# Patient Record
Sex: MALE | Race: WHITE | Employment: FULL TIME | URBAN - METROPOLITAN AREA
[De-identification: names, ages, dates, MRNs, and addresses within clinical notes are randomized per-mention and may not be internally consistent; named-entity substitution may affect disease eponyms.]

---

## 2017-01-30 ENCOUNTER — OFFICE VISIT (OUTPATIENT)
Dept: FAMILY MEDICINE CLINIC | Age: 57
End: 2017-01-30

## 2017-01-30 VITALS
DIASTOLIC BLOOD PRESSURE: 74 MMHG | TEMPERATURE: 97.6 F | BODY MASS INDEX: 37.03 KG/M2 | HEART RATE: 72 BPM | HEIGHT: 73 IN | SYSTOLIC BLOOD PRESSURE: 122 MMHG | WEIGHT: 279.4 LBS | RESPIRATION RATE: 16 BRPM

## 2017-01-30 DIAGNOSIS — M17.0 PRIMARY OSTEOARTHRITIS OF BOTH KNEES: ICD-10-CM

## 2017-01-30 DIAGNOSIS — Z12.5 SCREENING FOR PROSTATE CANCER: ICD-10-CM

## 2017-01-30 DIAGNOSIS — M13.0 POLYARTHRITIS: ICD-10-CM

## 2017-01-30 DIAGNOSIS — E78.5 DYSLIPIDEMIA: ICD-10-CM

## 2017-01-30 DIAGNOSIS — R53.82 CHRONIC FATIGUE: ICD-10-CM

## 2017-01-30 DIAGNOSIS — I10 ESSENTIAL HYPERTENSION: Primary | ICD-10-CM

## 2017-01-30 PROCEDURE — 99214 OFFICE O/P EST MOD 30 MIN: CPT | Performed by: FAMILY MEDICINE

## 2017-01-30 RX ORDER — SILDENAFIL 100 MG/1
100 TABLET, FILM COATED ORAL PRN
Qty: 10 TABLET | Refills: 5 | Status: SHIPPED | OUTPATIENT
Start: 2017-01-30

## 2017-01-30 RX ORDER — ZOLPIDEM TARTRATE 10 MG/1
10 TABLET ORAL NIGHTLY PRN
Qty: 15 TABLET | Refills: 5 | Status: SHIPPED | OUTPATIENT
Start: 2017-01-30 | End: 2017-07-21 | Stop reason: SDUPTHER

## 2017-01-30 RX ORDER — HYDROCODONE BITARTRATE AND ACETAMINOPHEN 5; 325 MG/1; MG/1
1-2 TABLET ORAL EVERY 8 HOURS PRN
Qty: 100 TABLET | Refills: 0 | Status: SHIPPED | OUTPATIENT
Start: 2017-01-30 | End: 2017-07-21 | Stop reason: SDUPTHER

## 2017-06-12 LAB
ALBUMIN SERPL-MCNC: 3.9 G/DL
ALP BLD-CCNC: 44 U/L
ALT SERPL-CCNC: 22 U/L
AST SERPL-CCNC: 19 U/L
BASOPHILS ABSOLUTE: 0.04 /ΜL
BASOPHILS RELATIVE PERCENT: 0.7 %
BILIRUB SERPL-MCNC: 0.7 MG/DL (ref 0.1–1.4)
BUN BLDV-MCNC: NORMAL MG/DL
CALCIUM SERPL-MCNC: 9 MG/DL
CHLORIDE BLD-SCNC: 103 MMOL/L
CHOLESTEROL, TOTAL: 179 MG/DL
CHOLESTEROL/HDL RATIO: 2.3
CO2: NORMAL MMOL/L
CREAT SERPL-MCNC: 0.8 MG/DL
EOSINOPHILS ABSOLUTE: 0.08 /ΜL
EOSINOPHILS RELATIVE PERCENT: 1.5 %
GFR CALCULATED: >60
GLUCOSE BLD-MCNC: 84 MG/DL
HCT VFR BLD CALC: 40.6 % (ref 41–53)
HDLC SERPL-MCNC: 77 MG/DL (ref 35–70)
HEMOGLOBIN: 13 G/DL (ref 13.5–17.5)
LDL CHOLESTEROL CALCULATED: 91 MG/DL (ref 0–160)
LYMPHOCYTES ABSOLUTE: 1.21 /ΜL
LYMPHOCYTES RELATIVE PERCENT: 22.4 %
MCH RBC QN AUTO: 28.7 PG
MCHC RBC AUTO-ENTMCNC: 32 G/DL
MCV RBC AUTO: 89.6 FL
MONOCYTES ABSOLUTE: 0.51 /ΜL
MONOCYTES RELATIVE PERCENT: 9.5 %
NEUTROPHILS ABSOLUTE: 3.55 /ΜL
NEUTROPHILS RELATIVE PERCENT: 65.9 %
PDW BLD-RTO: 14.6 %
PLATELET # BLD: 192 K/ΜL
PMV BLD AUTO: 10.2 FL
POTASSIUM SERPL-SCNC: 4.5 MMOL/L
PROSTATE SPECIFIC ANTIGEN: 1.2 NG/ML
RBC # BLD: 4.53 10^6/ΜL
SODIUM BLD-SCNC: 139 MMOL/L
TOTAL PROTEIN: 7.2
TRIGL SERPL-MCNC: 57 MG/DL
VLDLC SERPL CALC-MCNC: 11 MG/DL
WBC # BLD: 5.4 10^3/ML

## 2017-06-16 DIAGNOSIS — R53.82 CHRONIC FATIGUE: ICD-10-CM

## 2017-06-16 DIAGNOSIS — Z12.5 SCREENING FOR PROSTATE CANCER: ICD-10-CM

## 2017-06-16 DIAGNOSIS — I10 ESSENTIAL HYPERTENSION: ICD-10-CM

## 2017-07-05 RX ORDER — VALSARTAN AND HYDROCHLOROTHIAZIDE 160; 12.5 MG/1; MG/1
1 TABLET, FILM COATED ORAL DAILY
Qty: 90 TABLET | Refills: 1 | Status: SHIPPED | OUTPATIENT
Start: 2017-07-05 | End: 2017-07-07 | Stop reason: SDUPTHER

## 2017-07-07 RX ORDER — VALSARTAN AND HYDROCHLOROTHIAZIDE 160; 12.5 MG/1; MG/1
1 TABLET, FILM COATED ORAL DAILY
Qty: 90 TABLET | Refills: 1 | Status: SHIPPED | OUTPATIENT
Start: 2017-07-07 | End: 2017-12-15 | Stop reason: SDUPTHER

## 2017-07-08 RX ORDER — FAMOTIDINE 20 MG/1
TABLET, FILM COATED ORAL
Qty: 90 TABLET | Refills: 1 | Status: SHIPPED | OUTPATIENT
Start: 2017-07-08 | End: 2017-12-21 | Stop reason: SDUPTHER

## 2017-07-21 ENCOUNTER — OFFICE VISIT (OUTPATIENT)
Dept: FAMILY MEDICINE CLINIC | Age: 57
End: 2017-07-21

## 2017-07-21 VITALS
RESPIRATION RATE: 18 BRPM | HEART RATE: 76 BPM | SYSTOLIC BLOOD PRESSURE: 138 MMHG | TEMPERATURE: 98 F | DIASTOLIC BLOOD PRESSURE: 70 MMHG | WEIGHT: 279.8 LBS | HEIGHT: 74 IN | BODY MASS INDEX: 35.91 KG/M2

## 2017-07-21 DIAGNOSIS — Z00.00 HEALTHCARE MAINTENANCE: Primary | ICD-10-CM

## 2017-07-21 DIAGNOSIS — M17.0 PRIMARY OSTEOARTHRITIS OF BOTH KNEES: ICD-10-CM

## 2017-07-21 DIAGNOSIS — F51.04 CHRONIC INSOMNIA: ICD-10-CM

## 2017-07-21 DIAGNOSIS — M15.9 PRIMARY OSTEOARTHRITIS INVOLVING MULTIPLE JOINTS: ICD-10-CM

## 2017-07-21 DIAGNOSIS — I10 ESSENTIAL HYPERTENSION: ICD-10-CM

## 2017-07-21 PROCEDURE — 99396 PREV VISIT EST AGE 40-64: CPT | Performed by: FAMILY MEDICINE

## 2017-07-21 RX ORDER — HYDROCODONE BITARTRATE AND ACETAMINOPHEN 5; 325 MG/1; MG/1
1-2 TABLET ORAL EVERY 8 HOURS PRN
Qty: 100 TABLET | Refills: 0 | Status: SHIPPED | OUTPATIENT
Start: 2017-07-21 | End: 2018-01-26 | Stop reason: SDUPTHER

## 2017-07-21 RX ORDER — ZOLPIDEM TARTRATE 10 MG/1
10 TABLET ORAL NIGHTLY PRN
Qty: 15 TABLET | Refills: 5 | Status: SHIPPED | OUTPATIENT
Start: 2017-07-21 | End: 2018-01-26 | Stop reason: SDUPTHER

## 2017-07-21 RX ORDER — FINASTERIDE 5 MG/1
5 TABLET, FILM COATED ORAL DAILY
Qty: 30 TABLET | Refills: 5 | Status: SHIPPED | OUTPATIENT
Start: 2017-07-21 | End: 2018-01-26 | Stop reason: SDUPTHER

## 2017-07-21 RX ORDER — TAMSULOSIN HYDROCHLORIDE 0.4 MG/1
CAPSULE ORAL
Qty: 30 CAPSULE | Refills: 5 | Status: SHIPPED | OUTPATIENT
Start: 2017-07-21 | End: 2018-01-26 | Stop reason: SDUPTHER

## 2017-07-21 ASSESSMENT — PATIENT HEALTH QUESTIONNAIRE - PHQ9
SUM OF ALL RESPONSES TO PHQ9 QUESTIONS 1 & 2: 0
2. FEELING DOWN, DEPRESSED OR HOPELESS: 0
SUM OF ALL RESPONSES TO PHQ QUESTIONS 1-9: 0
1. LITTLE INTEREST OR PLEASURE IN DOING THINGS: 0
SUM OF ALL RESPONSES TO PHQ9 QUESTIONS 1 & 2: 0
1. LITTLE INTEREST OR PLEASURE IN DOING THINGS: 0
SUM OF ALL RESPONSES TO PHQ QUESTIONS 1-9: 0
2. FEELING DOWN, DEPRESSED OR HOPELESS: 0

## 2017-12-15 RX ORDER — VALSARTAN AND HYDROCHLOROTHIAZIDE 160; 12.5 MG/1; MG/1
1 TABLET, FILM COATED ORAL DAILY
Qty: 90 TABLET | Refills: 1 | Status: SHIPPED | OUTPATIENT
Start: 2017-12-15 | End: 2018-06-15 | Stop reason: SDUPTHER

## 2017-12-21 RX ORDER — FAMOTIDINE 20 MG/1
TABLET, FILM COATED ORAL
Qty: 90 TABLET | Refills: 1 | Status: SHIPPED | OUTPATIENT
Start: 2017-12-21 | End: 2018-06-27 | Stop reason: SDUPTHER

## 2018-01-26 ENCOUNTER — OFFICE VISIT (OUTPATIENT)
Dept: FAMILY MEDICINE CLINIC | Age: 58
End: 2018-01-26
Payer: COMMERCIAL

## 2018-01-26 VITALS
RESPIRATION RATE: 18 BRPM | BODY MASS INDEX: 37.74 KG/M2 | HEART RATE: 84 BPM | DIASTOLIC BLOOD PRESSURE: 68 MMHG | TEMPERATURE: 97.6 F | WEIGHT: 284.8 LBS | SYSTOLIC BLOOD PRESSURE: 132 MMHG | HEIGHT: 73 IN

## 2018-01-26 DIAGNOSIS — M17.0 PRIMARY OSTEOARTHRITIS OF BOTH KNEES: ICD-10-CM

## 2018-01-26 DIAGNOSIS — B96.89 ACUTE BACTERIAL SINUSITIS: ICD-10-CM

## 2018-01-26 DIAGNOSIS — M15.9 PRIMARY OSTEOARTHRITIS INVOLVING MULTIPLE JOINTS: ICD-10-CM

## 2018-01-26 DIAGNOSIS — J01.90 ACUTE BACTERIAL SINUSITIS: ICD-10-CM

## 2018-01-26 DIAGNOSIS — I10 ESSENTIAL HYPERTENSION: Primary | ICD-10-CM

## 2018-01-26 DIAGNOSIS — R05.9 COUGH: ICD-10-CM

## 2018-01-26 DIAGNOSIS — Z00.00 HEALTHCARE MAINTENANCE: ICD-10-CM

## 2018-01-26 DIAGNOSIS — E78.5 DYSLIPIDEMIA: ICD-10-CM

## 2018-01-26 DIAGNOSIS — F51.04 CHRONIC INSOMNIA: ICD-10-CM

## 2018-01-26 PROCEDURE — 99214 OFFICE O/P EST MOD 30 MIN: CPT | Performed by: FAMILY MEDICINE

## 2018-01-26 RX ORDER — TAMSULOSIN HYDROCHLORIDE 0.4 MG/1
CAPSULE ORAL
Qty: 30 CAPSULE | Refills: 5 | Status: SHIPPED | OUTPATIENT
Start: 2018-01-26 | End: 2018-07-26 | Stop reason: SDUPTHER

## 2018-01-26 RX ORDER — ZOLPIDEM TARTRATE 10 MG/1
10 TABLET ORAL NIGHTLY PRN
Qty: 15 TABLET | Refills: 5 | Status: SHIPPED | OUTPATIENT
Start: 2018-01-26 | End: 2018-07-16 | Stop reason: SDUPTHER

## 2018-01-26 RX ORDER — CEFUROXIME AXETIL 250 MG/1
250 TABLET ORAL 2 TIMES DAILY
Qty: 20 TABLET | Refills: 0 | Status: SHIPPED | OUTPATIENT
Start: 2018-01-26 | End: 2018-02-05

## 2018-01-26 RX ORDER — FINASTERIDE 5 MG/1
5 TABLET, FILM COATED ORAL DAILY
Qty: 30 TABLET | Refills: 5 | Status: SHIPPED | OUTPATIENT
Start: 2018-01-26 | End: 2018-07-26 | Stop reason: SDUPTHER

## 2018-01-26 RX ORDER — HYDROCODONE BITARTRATE AND ACETAMINOPHEN 5; 325 MG/1; MG/1
1 TABLET ORAL EVERY 6 HOURS PRN
Qty: 100 TABLET | Refills: 0 | Status: SHIPPED | OUTPATIENT
Start: 2018-01-26 | End: 2018-07-26 | Stop reason: SDUPTHER

## 2018-01-26 ASSESSMENT — PATIENT HEALTH QUESTIONNAIRE - PHQ9
2. FEELING DOWN, DEPRESSED OR HOPELESS: 0
1. LITTLE INTEREST OR PLEASURE IN DOING THINGS: 0
SUM OF ALL RESPONSES TO PHQ QUESTIONS 1-9: 0
SUM OF ALL RESPONSES TO PHQ9 QUESTIONS 1 & 2: 0

## 2018-07-16 DIAGNOSIS — F51.04 CHRONIC INSOMNIA: ICD-10-CM

## 2018-07-16 RX ORDER — ZOLPIDEM TARTRATE 10 MG/1
10 TABLET ORAL NIGHTLY PRN
Qty: 15 TABLET | Refills: 0 | Status: SHIPPED | OUTPATIENT
Start: 2018-07-16 | End: 2019-01-15 | Stop reason: SDUPTHER

## 2018-07-16 NOTE — TELEPHONE ENCOUNTER
PHARMACY REQUESTING REFILL PATIENT LAST SEEN 1/26/18  PLEASE APPROVE OR DENY.      Future Appointments  Date Time Provider Macrina Turpin   7/26/2018 9:15 AM Esequiel Oakley MD [facility]

## 2018-07-25 LAB
A/G RATIO: 1.6 (ref 0.9–2.4)
ALBUMIN: 4.1 G/DL (ref 3.4–5)
ALP BLD-CCNC: 67 U/L (ref 45–117)
ALT SERPL-CCNC: 81 U/L (ref 10–52)
ANION GAP SERPL CALCULATED.3IONS-SCNC: 9 MMOL/L (ref 10–20)
AST SERPL-CCNC: 49 U/L (ref 13–39)
BASOPHILS # BLD: 0.5 % (ref 0–1.3)
BASOPHILS ABSOLUTE: 0.03 10*3/UL (ref 0.01–0.09)
BICARBONATE: 31 MMOL/L (ref 21–32)
BILIRUB SERPL-MCNC: 0.5 MG/DL (ref 0–1.2)
BUN / CREAT RATIO: 39 (ref 5–25)
CALCIUM SERPL-MCNC: 9.1 MG/DL (ref 8.6–10.3)
CHLORIDE BLD-SCNC: 104 MMOL/L (ref 98–107)
CHOLESTEROL/HDL RATIO: 2.5
CHOLESTEROL: 180 MG/DL
CREAT SERPL-MCNC: 0.7 MG/DL (ref 0.5–1.3)
EOSINOPHIL # BLD: 1.4 % (ref 0–6.7)
EOSINOPHILS ABSOLUTE: 0.08 10*3/UL (ref 0.01–0.46)
ERYTHROCYTE [DISTWIDTH] IN BLOOD BY AUTOMATED COUNT: 13.5 % (ref 12–15.4)
ERYTHROCYTE [DISTWIDTH] IN BLOOD BY AUTOMATED COUNT: 45.9 FL (ref 39.3–48.6)
GFR CALCULATED: >60
GLUCOSE: 91 MG/DL (ref 70–100)
HCT VFR BLD CALC: 41.6 % (ref 38.4–54.9)
HDLC SERPL-MCNC: 73 MG/DL
HEMOGLOBIN: 13.5 G/DL (ref 12.8–17.7)
IMMATURE GRANS (ABS): 0.01 10*3/UL
IMMATURE GRANULOCYTES: 0.2 %
LDL CHOLESTEROL CALCULATED: 96 MG/DL
LYMPHOCYTES # BLD: 21 % (ref 9.4–41.1)
LYMPHOCYTES ABSOLUTE: 1.17 10*3/UL (ref 0.4–2.84)
MCH RBC QN AUTO: 29.7 PG (ref 27.5–32.9)
MCHC RBC AUTO-ENTMCNC: 32.5 G/DL (ref 30.5–35.4)
MCV RBC AUTO: 91.4 FL (ref 83.3–98.2)
MONOCYTES # BLD: 11 % (ref 3–16.2)
MONOCYTES ABSOLUTE: 0.61 10*3/UL (ref 0.25–1.33)
NEUTROPHILS ABSOLUTE: 3.67 10*3/UL (ref 2.22–7.53)
NEUTROPHILS: 65.9 % (ref 46.2–79.1)
PLATELET # BLD: 194 10*3/UL (ref 155–404)
PMV BLD AUTO: 10.8 FL (ref 9.9–12.1)
POTASSIUM SERPL-SCNC: 4.1 MMOL/L (ref 3.5–5.1)
RBC: 4.55 10*6/UL (ref 4.08–6.37)
SODIUM BLD-SCNC: 140 MMOL/L (ref 136–145)
TOTAL PROTEIN: 6.6 G/DL (ref 6.4–8.2)
TRIGL SERPL-MCNC: 57 MG/DL
UREA NITROGEN: 27 MG/DL (ref 6–23)
VLDLC SERPL CALC-MCNC: 11 MG/DL
WBC: 5.6 10*3/UL (ref 4.2–11)

## 2018-07-26 ENCOUNTER — OFFICE VISIT (OUTPATIENT)
Dept: FAMILY MEDICINE CLINIC | Age: 58
End: 2018-07-26
Payer: COMMERCIAL

## 2018-07-26 ENCOUNTER — TELEPHONE (OUTPATIENT)
Dept: FAMILY MEDICINE CLINIC | Age: 58
End: 2018-07-26

## 2018-07-26 VITALS
TEMPERATURE: 97.6 F | SYSTOLIC BLOOD PRESSURE: 120 MMHG | HEIGHT: 73 IN | DIASTOLIC BLOOD PRESSURE: 76 MMHG | HEART RATE: 72 BPM | BODY MASS INDEX: 36.71 KG/M2 | WEIGHT: 277 LBS | RESPIRATION RATE: 14 BRPM

## 2018-07-26 DIAGNOSIS — I10 ESSENTIAL HYPERTENSION: ICD-10-CM

## 2018-07-26 DIAGNOSIS — N39.43 BENIGN PROSTATIC HYPERPLASIA WITH POST-VOID DRIBBLING: ICD-10-CM

## 2018-07-26 DIAGNOSIS — M15.9 PRIMARY OSTEOARTHRITIS INVOLVING MULTIPLE JOINTS: ICD-10-CM

## 2018-07-26 DIAGNOSIS — K76.89 LIVER DYSFUNCTION: ICD-10-CM

## 2018-07-26 DIAGNOSIS — Z00.00 HEALTHCARE MAINTENANCE: Primary | ICD-10-CM

## 2018-07-26 DIAGNOSIS — N40.1 BENIGN PROSTATIC HYPERPLASIA WITH POST-VOID DRIBBLING: ICD-10-CM

## 2018-07-26 DIAGNOSIS — F51.04 CHRONIC INSOMNIA: ICD-10-CM

## 2018-07-26 DIAGNOSIS — M17.0 PRIMARY OSTEOARTHRITIS OF BOTH KNEES: ICD-10-CM

## 2018-07-26 DIAGNOSIS — E78.5 DYSLIPIDEMIA: ICD-10-CM

## 2018-07-26 PROCEDURE — 99396 PREV VISIT EST AGE 40-64: CPT | Performed by: FAMILY MEDICINE

## 2018-07-26 RX ORDER — IRBESARTAN AND HYDROCHLOROTHIAZIDE 150; 12.5 MG/1; MG/1
1 TABLET, FILM COATED ORAL DAILY
Qty: 30 TABLET | Refills: 3 | Status: SHIPPED | OUTPATIENT
Start: 2018-07-26 | End: 2018-12-03 | Stop reason: SDUPTHER

## 2018-07-26 RX ORDER — TAMSULOSIN HYDROCHLORIDE 0.4 MG/1
CAPSULE ORAL
Qty: 30 CAPSULE | Refills: 5 | Status: SHIPPED | OUTPATIENT
Start: 2018-07-26 | End: 2019-01-25 | Stop reason: SDUPTHER

## 2018-07-26 RX ORDER — FINASTERIDE 5 MG/1
5 TABLET, FILM COATED ORAL DAILY
Qty: 30 TABLET | Refills: 5 | Status: SHIPPED | OUTPATIENT
Start: 2018-07-26 | End: 2019-01-25 | Stop reason: SDUPTHER

## 2018-07-26 RX ORDER — ZOLPIDEM TARTRATE 10 MG/1
10 TABLET ORAL NIGHTLY PRN
Qty: 15 TABLET | Refills: 5 | Status: SHIPPED | OUTPATIENT
Start: 2018-07-26 | End: 2019-01-25 | Stop reason: SDUPTHER

## 2018-07-26 RX ORDER — HYDROCODONE BITARTRATE AND ACETAMINOPHEN 5; 325 MG/1; MG/1
1 TABLET ORAL EVERY 6 HOURS PRN
Qty: 100 TABLET | Refills: 0 | Status: SHIPPED | OUTPATIENT
Start: 2018-07-26 | End: 2019-01-25 | Stop reason: SDUPTHER

## 2018-07-26 ASSESSMENT — PATIENT HEALTH QUESTIONNAIRE - PHQ9
2. FEELING DOWN, DEPRESSED OR HOPELESS: 0
1. LITTLE INTEREST OR PLEASURE IN DOING THINGS: 0
SUM OF ALL RESPONSES TO PHQ9 QUESTIONS 1 & 2: 0
SUM OF ALL RESPONSES TO PHQ QUESTIONS 1-9: 0

## 2018-07-26 NOTE — PROGRESS NOTES
Chief Complaint   Patient presents with    6 Month Follow-Up     HTN, dyslipidemia, other chronic medical conditions and labs        Erin Apley is 61 y.o. male who presents for annual physical exam.  Patient was last seen 6 months ago. Hypertension  Patient is here for follow-up of elevated blood pressure. He is exercising and is adherent to a low-salt diet. Blood pressure is well controlled at home. Cardiac symptoms: none. Patient denies chest pain, claudication, dyspnea, lower extremity edema, near-syncope, orthopnea, palpitations and paroxysmal nocturnal dyspnea. Cardiovascular risk factors: advanced age (older than 54 for men, 72 for women), hypertension, male gender and obesity (BMI >= 30 kg/m2). Use of agents associated with hypertension: none. History of target organ damage: none. He is also following up on chronic low back pain and bilateral knee pain. His pain is currently controlled with Norco which he takes as needed. He has severe stomatitis of the knees and degenerative disc disease. He has unable to tolerate NSAIDs. He has had therapy in the past for department. He has no tingling or numbness of his lower extremities. He has been taking the medications as recommended and no evidence of abuse. No red flags. Patient Active Problem List   Diagnosis    Benign prostatic hyperplasia    Hypertension    Dyslipidemia    Insomnia    Obesity    Sleep apnea    Primary osteoarthritis of both knees    Postsurgical nonabsorption       has a current medication list which includes the following prescription(s): hydrocodone-acetaminophen, finasteride, tamsulosin, zolpidem, irbesartan-hydrochlorothiazide, zolpidem, famotidine, sildenafil, multiple vitamin, and vitamin b-12.     Past Medical History:   Diagnosis Date    BPH (benign prostatic hypertrophy)     Dyslipidemia     Hematuria     Hypertension     Insomnia     Obesity     Osteoarthritis     Sleep apnea     Varicose in bowel habits, or black or bloody stools  Genito-Urinary ROS: no dysuria, trouble voiding, or hematuria  Musculoskeletal ROS: positive for - joint pain, joint stiffness and pain in lower back    Neurological ROS: negative for - dizziness, gait disturbance, headaches, impaired coordination/balance, numbness/tingling, tremors or visual changes  Dermatological ROS: negative for - dry skin, lumps, pruritus or rash    Blood pressure 120/76, pulse 72, temperature 97.6 °F (36.4 °C), temperature source Temporal, resp. rate 14, height 6' 1\" (1.854 m), weight 277 lb (125.6 kg). Physical Examination: General appearance - alert, well appearing, and in no distress  Mental status - alert, oriented to person, place, and time  Eyes - pupils equal and reactive, extraocular eye movements intact  Ears - bilateral TM's and external ear canals normal  Mouth - mucous membranes moist, pharynx normal without lesions  Neck - supple, no significant adenopathy  Lymphatics - no palpable lymphadenopathy, no hepatosplenomegaly  Chest - clear to auscultation, no wheezes, rales or rhonchi, symmetric air entry  Heart - normal rate, regular rhythm, normal S1, S2, no murmurs, rubs, clicks or gallops  Abdomen - soft, nontender, nondistended, no masses or organomegaly  Neurological - alert, oriented, normal speech, no focal findings or movement disorder noted  Musculoskeletal - no joint tenderness, deformity or swelling  Extremities: peripheral pulses normal, no pedal edema, no clubbing or cyanosis. Diagnosis Orders   1. Healthcare maintenance     2. Essential hypertension  irbesartan-hydrochlorothiazide (AVALIDE) 150-12.5 MG per tablet   3. Dyslipidemia     4. Primary osteoarthritis involving multiple joints  HYDROcodone-acetaminophen (NORCO) 5-325 MG per tablet   5. Primary osteoarthritis of both knees  HYDROcodone-acetaminophen (NORCO) 5-325 MG per tablet   6. Chronic insomnia  zolpidem (AMBIEN) 10 MG tablet   7.  Liver dysfunction Hepatic Function Panel    Hepatitis C Antibody    Hepatitis B Surface Antigen    Ferritin   8. Benign prostatic hyperplasia with post-void dribbling  finasteride (PROSCAR) 5 MG tablet    tamsulosin (FLOMAX) 0.4 MG capsule         Orders Placed This Encounter   Procedures    Hepatic Function Panel     Standing Status:   Future     Standing Expiration Date:   7/26/2019    Hepatitis C Antibody     Standing Status:   Future     Standing Expiration Date:   7/26/2019    Hepatitis B Surface Antigen     Standing Status:   Future     Standing Expiration Date:   7/26/2019    Ferritin     Standing Status:   Future     Standing Expiration Date:   7/26/2019       Outpatient Encounter Prescriptions as of 7/26/2018   Medication Sig Dispense Refill    HYDROcodone-acetaminophen (NORCO) 5-325 MG per tablet Take 1 tablet by mouth every 6 hours as needed for Pain for up to 90 days. . 100 tablet 0    finasteride (PROSCAR) 5 MG tablet Take 1 tablet by mouth daily 30 tablet 5    tamsulosin (FLOMAX) 0.4 MG capsule TAKE ONE CAPSULE BY MOUTH ONE TIME DAILY 30 capsule 5    zolpidem (AMBIEN) 10 MG tablet Take 1 tablet by mouth nightly as needed for Sleep for up to 30 days. . 15 tablet 5    irbesartan-hydrochlorothiazide (AVALIDE) 150-12.5 MG per tablet Take 1 tablet by mouth daily 30 tablet 3    zolpidem (AMBIEN) 10 MG tablet Take 1 tablet by mouth nightly as needed for Sleep for up to 30 days. . 15 tablet 0    famotidine (PEPCID) 20 MG tablet TAKE ONE TABLET BY MOUTH ONE TIME DAILY 90 tablet 3    sildenafil (VIAGRA) 100 MG tablet Take 1 tablet by mouth as needed for Erectile Dysfunction Max 1 in 72 hours 10 tablet 5    Multiple Vitamin (MULTI VITAMIN MENS PO) Take  by mouth.  vitamin B-12 (CYANOCOBALAMIN) 1000 MCG tablet Take 1,000 mcg by mouth daily.         [DISCONTINUED] valsartan-hydrochlorothiazide (DIOVAN-HCT) 160-12.5 MG per tablet TAKE 1 TABLET BY MOUTH DAILY 90 tablet 1    [DISCONTINUED] HYDROcodone-acetaminophen (NORCO) 5-325 MG per tablet Take 1 tablet by mouth every 6 hours as needed for Pain for up to 90 days. 100 tablet 0    [DISCONTINUED] finasteride (PROSCAR) 5 MG tablet Take 1 tablet by mouth daily 30 tablet 5    [DISCONTINUED] tamsulosin (FLOMAX) 0.4 MG capsule TAKE ONE CAPSULE BY MOUTH ONE TIME DAILY 30 capsule 5     No facility-administered encounter medications on file as of 7/26/2018. The nature of cardiac risk has been fully discussed with this patient. I have made him aware of his LDL target goal given his cardiovascular risk analysis. I have discussed the appropriate diet. The need for lifelong compliance in order to reduce risk is stressed. A regular exercise program is recommended to help achieve and maintain normal body weight, fitness and improve lipid balance. A written copy of a low fat, low cholesterol diet has been given to the patient. Patient education provided. They understand and agree with this course of treatment. They will return with new or worsening symptoms. Patient instructed to remain current with appropriate annual health maintenance. Return in about 6 months (around 1/26/2019). Controlled Substances Monitoring:     RX Monitoring 7/26/2018   Attestation The Prescription Monitoring Report for this patient was reviewed today. Documentation Possible medication side effects, risk of tolerance/dependence & alternative treatments discussed. ;Obtaining appropriate analgesic effect of treatment. ;No signs of potential drug abuse or diversion identified.

## 2018-09-04 RX ORDER — FINASTERIDE 5 MG/1
5 TABLET, FILM COATED ORAL DAILY
Qty: 30 TABLET | Refills: 2 | OUTPATIENT
Start: 2018-09-04

## 2018-09-04 NOTE — TELEPHONE ENCOUNTER
Pharmacy requesting refill. Last OV 7/26/18  rx refused.  rx sent in on 7/26/18 with 5 refills  Future Appointments  Date Time Provider Macrina Turpin   1/25/2019 9:15 AM Sofie Perez MD [address]

## 2018-11-06 LAB
A/G RATIO: 1.5 (ref 0.9–2.4)
ALBUMIN: 4.1 G/DL (ref 3.4–5)
ALP BLD-CCNC: 51 U/L (ref 45–117)
ALT SERPL-CCNC: 17 U/L (ref 10–52)
AST SERPL-CCNC: 16 U/L (ref 13–39)
BILIRUB SERPL-MCNC: 0.4 MG/DL (ref 0–1.2)
BILIRUBIN DIRECT: 0.1 MG/DL (ref 0–0.3)
FERRITIN: 129 NG/ML (ref 20–300)
HEPATITIS B SURFACE ANTIGEN: NONREACTIVE
HEPATITIS C ANTIBODY: NORMAL
TOTAL PROTEIN: 6.9 G/DL (ref 6.4–8.2)

## 2018-12-03 DIAGNOSIS — I10 ESSENTIAL HYPERTENSION: ICD-10-CM

## 2018-12-03 RX ORDER — IRBESARTAN AND HYDROCHLOROTHIAZIDE 150; 12.5 MG/1; MG/1
TABLET, FILM COATED ORAL
Qty: 30 TABLET | Refills: 1 | Status: SHIPPED | OUTPATIENT
Start: 2018-12-03 | End: 2019-01-25 | Stop reason: SDUPTHER

## 2019-01-15 ENCOUNTER — OFFICE VISIT (OUTPATIENT)
Dept: FAMILY MEDICINE CLINIC | Age: 59
End: 2019-01-15
Payer: COMMERCIAL

## 2019-01-15 VITALS
BODY MASS INDEX: 38.04 KG/M2 | RESPIRATION RATE: 14 BRPM | HEIGHT: 73 IN | TEMPERATURE: 97.5 F | SYSTOLIC BLOOD PRESSURE: 136 MMHG | WEIGHT: 287 LBS | DIASTOLIC BLOOD PRESSURE: 74 MMHG | HEART RATE: 72 BPM

## 2019-01-15 DIAGNOSIS — J01.90 ACUTE BACTERIAL SINUSITIS: Primary | ICD-10-CM

## 2019-01-15 DIAGNOSIS — R05.9 COUGH: ICD-10-CM

## 2019-01-15 DIAGNOSIS — B96.89 ACUTE BACTERIAL SINUSITIS: Primary | ICD-10-CM

## 2019-01-15 PROCEDURE — 99213 OFFICE O/P EST LOW 20 MIN: CPT | Performed by: FAMILY MEDICINE

## 2019-01-15 RX ORDER — AMOXICILLIN AND CLAVULANATE POTASSIUM 875; 125 MG/1; MG/1
1 TABLET, FILM COATED ORAL 2 TIMES DAILY
Qty: 20 TABLET | Refills: 0 | Status: SHIPPED | OUTPATIENT
Start: 2019-01-15 | End: 2019-01-25

## 2019-01-15 ASSESSMENT — PATIENT HEALTH QUESTIONNAIRE - PHQ9
SUM OF ALL RESPONSES TO PHQ9 QUESTIONS 1 & 2: 0
SUM OF ALL RESPONSES TO PHQ QUESTIONS 1-9: 0
2. FEELING DOWN, DEPRESSED OR HOPELESS: 0
1. LITTLE INTEREST OR PLEASURE IN DOING THINGS: 0
SUM OF ALL RESPONSES TO PHQ QUESTIONS 1-9: 0

## 2019-01-25 ENCOUNTER — OFFICE VISIT (OUTPATIENT)
Dept: FAMILY MEDICINE CLINIC | Age: 59
End: 2019-01-25
Payer: COMMERCIAL

## 2019-01-25 VITALS
HEART RATE: 60 BPM | RESPIRATION RATE: 14 BRPM | WEIGHT: 292 LBS | SYSTOLIC BLOOD PRESSURE: 122 MMHG | BODY MASS INDEX: 38.7 KG/M2 | TEMPERATURE: 96.8 F | DIASTOLIC BLOOD PRESSURE: 82 MMHG | HEIGHT: 73 IN

## 2019-01-25 DIAGNOSIS — I10 ESSENTIAL HYPERTENSION: Primary | ICD-10-CM

## 2019-01-25 DIAGNOSIS — M15.9 PRIMARY OSTEOARTHRITIS INVOLVING MULTIPLE JOINTS: ICD-10-CM

## 2019-01-25 DIAGNOSIS — N40.1 BENIGN PROSTATIC HYPERPLASIA WITH POST-VOID DRIBBLING: ICD-10-CM

## 2019-01-25 DIAGNOSIS — M17.0 PRIMARY OSTEOARTHRITIS OF BOTH KNEES: ICD-10-CM

## 2019-01-25 DIAGNOSIS — E78.5 DYSLIPIDEMIA: ICD-10-CM

## 2019-01-25 DIAGNOSIS — F51.04 CHRONIC INSOMNIA: ICD-10-CM

## 2019-01-25 DIAGNOSIS — N39.43 BENIGN PROSTATIC HYPERPLASIA WITH POST-VOID DRIBBLING: ICD-10-CM

## 2019-01-25 PROCEDURE — 99214 OFFICE O/P EST MOD 30 MIN: CPT | Performed by: FAMILY MEDICINE

## 2019-01-25 RX ORDER — TAMSULOSIN HYDROCHLORIDE 0.4 MG/1
CAPSULE ORAL
Qty: 30 CAPSULE | Refills: 5 | Status: SHIPPED | OUTPATIENT
Start: 2019-01-25

## 2019-01-25 RX ORDER — FINASTERIDE 5 MG/1
5 TABLET, FILM COATED ORAL DAILY
Qty: 30 TABLET | Refills: 5 | Status: SHIPPED | OUTPATIENT
Start: 2019-01-25

## 2019-01-25 RX ORDER — HYDROCODONE BITARTRATE AND ACETAMINOPHEN 5; 325 MG/1; MG/1
1 TABLET ORAL EVERY 6 HOURS PRN
Qty: 100 TABLET | Refills: 0 | Status: SHIPPED | OUTPATIENT
Start: 2019-01-25 | End: 2019-04-25

## 2019-01-25 RX ORDER — IRBESARTAN AND HYDROCHLOROTHIAZIDE 150; 12.5 MG/1; MG/1
TABLET, FILM COATED ORAL
Qty: 30 TABLET | Refills: 1 | Status: SHIPPED | OUTPATIENT
Start: 2019-01-25 | End: 2019-03-21 | Stop reason: RX

## 2019-01-25 RX ORDER — ZOLPIDEM TARTRATE 10 MG/1
10 TABLET ORAL NIGHTLY PRN
Qty: 15 TABLET | Refills: 5 | Status: SHIPPED | OUTPATIENT
Start: 2019-01-25 | End: 2019-02-24

## 2019-03-12 ENCOUNTER — TELEPHONE (OUTPATIENT)
Dept: FAMILY MEDICINE CLINIC | Age: 59
End: 2019-03-12

## 2019-03-12 DIAGNOSIS — N40.1 BENIGN PROSTATIC HYPERPLASIA WITH POST-VOID DRIBBLING: ICD-10-CM

## 2019-03-12 DIAGNOSIS — N39.43 BENIGN PROSTATIC HYPERPLASIA WITH POST-VOID DRIBBLING: ICD-10-CM

## 2019-03-12 RX ORDER — FINASTERIDE 5 MG/1
TABLET, FILM COATED ORAL
Qty: 30 TABLET | Refills: 5 | OUTPATIENT
Start: 2019-03-12

## 2019-03-21 ENCOUNTER — TELEPHONE (OUTPATIENT)
Dept: FAMILY MEDICINE CLINIC | Age: 59
End: 2019-03-21

## 2019-03-21 RX ORDER — VALSARTAN AND HYDROCHLOROTHIAZIDE 160; 12.5 MG/1; MG/1
1 TABLET, FILM COATED ORAL DAILY
Qty: 90 TABLET | Refills: 1 | Status: SHIPPED | OUTPATIENT
Start: 2019-03-21

## 2019-03-25 ENCOUNTER — TELEPHONE (OUTPATIENT)
Dept: FAMILY MEDICINE CLINIC | Age: 59
End: 2019-03-25

## 2023-02-15 PROBLEM — R19.5 POSITIVE COLORECTAL CANCER SCREENING USING COLOGUARD TEST: Status: ACTIVE | Noted: 2023-02-15

## 2023-02-15 PROBLEM — N20.0 LEFT NEPHROLITHIASIS: Status: ACTIVE | Noted: 2023-02-15

## 2023-02-15 PROBLEM — N28.89 MASS OF KIDNEY: Status: ACTIVE | Noted: 2023-02-15

## 2023-02-15 PROBLEM — J01.00 ACUTE NON-RECURRENT MAXILLARY SINUSITIS: Status: ACTIVE | Noted: 2023-02-15

## 2023-02-15 PROBLEM — R39.11 URINARY HESITANCY: Status: ACTIVE | Noted: 2023-02-15

## 2023-02-15 PROBLEM — F51.04 CHRONIC INSOMNIA: Status: ACTIVE | Noted: 2023-02-15

## 2023-02-15 PROBLEM — M17.9 KNEE OSTEOARTHRITIS: Status: ACTIVE | Noted: 2023-02-15

## 2023-02-15 PROBLEM — E66.812 CLASS 2 OBESITY WITH BODY MASS INDEX (BMI) OF 39.0 TO 39.9 IN ADULT: Status: ACTIVE | Noted: 2023-02-15

## 2023-02-15 PROBLEM — R09.81 SINUS CONGESTION: Status: ACTIVE | Noted: 2023-02-15

## 2023-02-15 PROBLEM — S40.019A SHOULDER CONTUSION: Status: ACTIVE | Noted: 2023-02-15

## 2023-02-15 PROBLEM — K21.9 GERD (GASTROESOPHAGEAL REFLUX DISEASE): Status: ACTIVE | Noted: 2023-02-15

## 2023-02-15 PROBLEM — K65.1 INTRA-ABDOMINAL ABSCESS (MULTI): Status: ACTIVE | Noted: 2023-02-15

## 2023-02-15 PROBLEM — N48.89 PENILE CYST: Status: ACTIVE | Noted: 2023-02-15

## 2023-02-15 PROBLEM — N23 RENAL COLIC ON LEFT SIDE: Status: ACTIVE | Noted: 2023-02-15

## 2023-02-15 PROBLEM — I10 ESSENTIAL HYPERTENSION: Status: ACTIVE | Noted: 2023-02-15

## 2023-02-15 PROBLEM — L72.3 INFECTED SEBACEOUS CYST: Status: ACTIVE | Noted: 2023-02-15

## 2023-02-15 PROBLEM — M19.90 GENERALIZED ARTHRITIS: Status: ACTIVE | Noted: 2023-02-15

## 2023-02-15 PROBLEM — L08.9 INFECTED SEBACEOUS CYST: Status: ACTIVE | Noted: 2023-02-15

## 2023-02-15 PROBLEM — N39.41 BENIGN PROSTATIC HYPERPLASIA (BPH) WITH URINARY URGE INCONTINENCE: Status: ACTIVE | Noted: 2023-02-15

## 2023-02-15 PROBLEM — G89.29 CHRONIC BILATERAL LOW BACK PAIN WITHOUT SCIATICA: Status: ACTIVE | Noted: 2023-02-15

## 2023-02-15 PROBLEM — R10.9 LEFT FLANK PAIN: Status: ACTIVE | Noted: 2023-02-15

## 2023-02-15 PROBLEM — R06.02 SOB (SHORTNESS OF BREATH) ON EXERTION: Status: ACTIVE | Noted: 2023-02-15

## 2023-02-15 PROBLEM — N40.0 PROSTATISM: Status: ACTIVE | Noted: 2023-02-15

## 2023-02-15 PROBLEM — E66.9 CLASS 2 OBESITY WITH BODY MASS INDEX (BMI) OF 39.0 TO 39.9 IN ADULT: Status: ACTIVE | Noted: 2023-02-15

## 2023-02-15 PROBLEM — M25.519 SHOULDER PAIN: Status: ACTIVE | Noted: 2023-02-15

## 2023-02-15 PROBLEM — M19.91 PRIMARY LOCALIZED OSTEOARTHROSIS OF MULTIPLE SITES: Status: ACTIVE | Noted: 2023-02-15

## 2023-02-15 PROBLEM — S91.111A LACERATION OF RIGHT GREAT TOE WITHOUT FOREIGN BODY PRESENT OR DAMAGE TO NAIL: Status: ACTIVE | Noted: 2023-02-15

## 2023-02-15 PROBLEM — R07.89 OTHER CHEST PAIN: Status: ACTIVE | Noted: 2023-02-15

## 2023-02-15 PROBLEM — M54.50 CHRONIC BILATERAL LOW BACK PAIN WITHOUT SCIATICA: Status: ACTIVE | Noted: 2023-02-15

## 2023-02-15 PROBLEM — M25.569 KNEE PAIN: Status: ACTIVE | Noted: 2023-02-15

## 2023-02-15 PROBLEM — N40.1 BENIGN PROSTATIC HYPERPLASIA (BPH) WITH URINARY URGE INCONTINENCE: Status: ACTIVE | Noted: 2023-02-15

## 2023-02-15 PROBLEM — K81.9 CHOLECYSTITIS: Status: ACTIVE | Noted: 2023-02-15

## 2023-02-15 PROBLEM — M77.51 TENDINITIS OF RIGHT FOOT: Status: ACTIVE | Noted: 2023-02-15

## 2023-02-15 PROBLEM — R60.0 LEG EDEMA, LEFT: Status: ACTIVE | Noted: 2023-02-15

## 2023-02-15 PROBLEM — G57.03 PIRIFORMIS SYNDROME OF BOTH SIDES: Status: ACTIVE | Noted: 2023-02-15

## 2023-02-15 PROBLEM — I83.892 VARICOSE VEINS OF LEFT LOWER EXTREMITY WITH EDEMA: Status: ACTIVE | Noted: 2023-02-15

## 2023-02-15 PROBLEM — R05.9 COUGH: Status: ACTIVE | Noted: 2023-02-15

## 2023-02-15 RX ORDER — FINASTERIDE 5 MG/1
1 TABLET, FILM COATED ORAL DAILY
COMMUNITY
Start: 2015-01-21 | End: 2023-12-19 | Stop reason: SDUPTHER

## 2023-02-15 RX ORDER — TADALAFIL 2.5 MG/1
TABLET ORAL
COMMUNITY
End: 2024-02-08 | Stop reason: CLARIF

## 2023-02-15 RX ORDER — MULTIVITAMIN
TABLET ORAL
COMMUNITY
End: 2024-03-07 | Stop reason: ALTCHOICE

## 2023-02-15 RX ORDER — NALOXONE HYDROCHLORIDE 4 MG/.1ML
SPRAY NASAL
COMMUNITY
Start: 2020-09-28

## 2023-02-15 RX ORDER — ZOLPIDEM TARTRATE 10 MG/1
10 TABLET ORAL NIGHTLY PRN
COMMUNITY
Start: 2015-09-09 | End: 2023-03-29 | Stop reason: SDUPTHER

## 2023-02-15 RX ORDER — FAMOTIDINE 20 MG/1
1 TABLET, FILM COATED ORAL DAILY
COMMUNITY
Start: 2015-02-24 | End: 2023-05-08

## 2023-02-15 RX ORDER — VALSARTAN AND HYDROCHLOROTHIAZIDE 160; 12.5 MG/1; MG/1
1 TABLET, FILM COATED ORAL DAILY
COMMUNITY
Start: 2015-01-21 | End: 2023-03-29 | Stop reason: SDUPTHER

## 2023-02-15 RX ORDER — HYDROCODONE BITARTRATE AND ACETAMINOPHEN 5; 325 MG/1; MG/1
1 TABLET ORAL EVERY 6 HOURS PRN
COMMUNITY
Start: 2019-05-08 | End: 2023-03-29 | Stop reason: SDUPTHER

## 2023-02-15 RX ORDER — TAMSULOSIN HYDROCHLORIDE 0.4 MG/1
1 CAPSULE ORAL DAILY
COMMUNITY
Start: 2015-02-24 | End: 2023-06-01

## 2023-03-02 LAB
APPEARANCE, URINE: CLEAR
BACTERIA, URINE: ABNORMAL /HPF
BILIRUBIN, URINE: NEGATIVE
BLOOD, URINE: NEGATIVE
COLOR, URINE: YELLOW
GLUCOSE, URINE: NEGATIVE MG/DL
HYALINE CASTS, URINE: ABNORMAL /LPF
KETONES, URINE: NEGATIVE MG/DL
LEUKOCYTE ESTERASE, URINE: ABNORMAL
MUCUS, URINE: ABNORMAL /LPF
NITRITE, URINE: NEGATIVE
PH, URINE: 5 (ref 5–8)
PROTEIN, URINE: NEGATIVE MG/DL
RBC, URINE: 3 /HPF (ref 0–5)
SPECIFIC GRAVITY, URINE: 1.02 (ref 1–1.03)
SQUAMOUS EPITHELIAL CELLS, URINE: <1 /HPF
UROBILINOGEN, URINE: <2 MG/DL (ref 0–1.9)
WBC, URINE: 4 /HPF (ref 0–5)

## 2023-03-03 LAB — URINE CULTURE: NO GROWTH

## 2023-03-14 ENCOUNTER — TELEMEDICINE (OUTPATIENT)
Dept: PRIMARY CARE | Facility: CLINIC | Age: 63
End: 2023-03-14
Payer: COMMERCIAL

## 2023-03-14 DIAGNOSIS — R05.9 COUGH: Primary | ICD-10-CM

## 2023-03-14 DIAGNOSIS — J40 BRONCHITIS: ICD-10-CM

## 2023-03-14 DIAGNOSIS — R09.81 SINUS CONGESTION: ICD-10-CM

## 2023-03-14 PROCEDURE — 99212 OFFICE O/P EST SF 10 MIN: CPT | Performed by: NURSE PRACTITIONER

## 2023-03-14 RX ORDER — ACETAMINOPHEN 325 MG/1
325 TABLET ORAL EVERY 6 HOURS PRN
COMMUNITY
Start: 2016-06-14

## 2023-03-14 RX ORDER — AMOXICILLIN 875 MG/1
875 TABLET, FILM COATED ORAL 2 TIMES DAILY
Qty: 20 TABLET | Refills: 0 | Status: SHIPPED | OUTPATIENT
Start: 2023-03-14 | End: 2023-03-24

## 2023-03-14 RX ORDER — MAGNESIUM 200 MG
TABLET ORAL
COMMUNITY

## 2023-03-14 ASSESSMENT — ENCOUNTER SYMPTOMS
WHEEZING: 0
HEARTBURN: 0
SINUS PRESSURE: 1
HEMATOLOGIC/LYMPHATIC NEGATIVE: 1
WEIGHT LOSS: 0
HEMOPTYSIS: 0
NEUROLOGICAL NEGATIVE: 1
HEADACHES: 0
CHILLS: 0
GASTROINTESTINAL NEGATIVE: 1
COUGH: 1
MYALGIAS: 0
RHINORRHEA: 1
FEVER: 0
ALLERGIC/IMMUNOLOGIC NEGATIVE: 1
SHORTNESS OF BREATH: 0
MUSCULOSKELETAL NEGATIVE: 1
PSYCHIATRIC NEGATIVE: 1
ENDOCRINE NEGATIVE: 1
EYES NEGATIVE: 1
CARDIOVASCULAR NEGATIVE: 1
SWEATS: 0
SORE THROAT: 1

## 2023-03-14 NOTE — PROGRESS NOTES
Subjective   Patient ID: Terry Lee is a 62 y.o. male who presents for No chief complaint on file..  Patient is virtual visit, telephone only today.  Patient has congestion, cough, and runny nose.   Patient has been taking tylenol and zicam with minimal relief.  Patient reports he gets this frequently and continues to get worse without antibiotics. Patient has increased fluids and honey for cough with minimal relief.      Cough  This is a recurrent problem. The current episode started 1 to 4 weeks ago. The problem has been gradually worsening. The problem occurs constantly. The cough is Productive of purulent sputum. Associated symptoms include nasal congestion, rhinorrhea and a sore throat. Pertinent negatives include no chest pain, chills, ear congestion, ear pain, fever, headaches, heartburn, hemoptysis, myalgias, postnasal drip, rash, shortness of breath, sweats, weight loss or wheezing. Nothing aggravates the symptoms.       Review of Systems   Constitutional:  Negative for chills, fever and weight loss.   HENT:  Positive for rhinorrhea, sinus pressure and sore throat. Negative for ear pain and postnasal drip.    Eyes: Negative.    Respiratory:  Positive for cough. Negative for hemoptysis, shortness of breath and wheezing.    Cardiovascular: Negative.  Negative for chest pain.   Gastrointestinal: Negative.  Negative for heartburn.   Endocrine: Negative.    Genitourinary: Negative.    Musculoskeletal: Negative.  Negative for myalgias.   Skin:  Negative for rash.   Allergic/Immunologic: Negative.    Neurological: Negative.  Negative for headaches.   Hematological: Negative.    Psychiatric/Behavioral: Negative.     All other systems reviewed and are negative.      There were no vitals taken for this visit.    Objective   Physical Exam   Unable to obtain vitals nor physical exam due to virtual visit.     Assessment/Plan   Problem List Items Addressed This Visit          Respiratory     Patient will continue  to increase fluids, take medication as prescribed and monitor for worsening symptoms.          Cough - Primary    Relevant Medications    amoxicillin (Amoxil) 875 mg tablet    Bronchitis    Relevant Medications    amoxicillin (Amoxil) 875 mg tablet       Other    Sinus congestion

## 2023-03-14 NOTE — PATIENT INSTRUCTIONS
Virtual Visit, telephone only today with office.  Diagnosis, treatment, and possible complications of today's illness discussed and explained to patient.  Patient to take medications as discussed and associated with this visit.  Patient may also take OTC analgesic/antipyretic if needed for pain/fever.  Advised to increase oral fluid intake as tolerated if no nausea or vomiting.  Patient Education on when to seek urgent/emergent care.  Patient educated and advised to come back if worsening or persistent symptoms. Patient was given opportunity to ask questions and denied any at this time.  Patient verbalized understanding of plan of care.

## 2023-03-14 NOTE — PROGRESS NOTES
Patient's PCP is Griffin Sharma MD      COLD SYMPTOMS  At home Covid-19 test: NO   Symptoms:  Runny nose, Congestion, Post Nasal Drainage, Productive Cough---Coughing fits, Sore throat, Sneezing, Fatigue,    Length of Symptoms: 2 Weeks   Denies: Fever, Headache, Sinus pressure on forehead and under eyes, BILATERAL LEFT RIGHT Ear ache pressure or popping, SOB, Wheezing, Nausea, Diarrhea, Vomiting, Chills, Body aches,   Factors that effect symptoms: Tylenol, Cough Drops, Zycam     --Related Information--  Weight: 280 Lb   Covid-19 Vaccinated:  Yes       -------------------------------- Answers submitted by the patient for this visit:  Cough Questionnaire (Submitted on 3/14/2023)  Chief Complaint: Cough  Chronicity: recurrent  Onset: 1 to 4 weeks ago  Progression since onset: gradually worsening  Frequency: constantly  Cough characteristics: productive of purulent sputum  chest pain: No  chills: No  ear congestion: No  ear pain: No  fever: No  headaches: No  heartburn: No  hemoptysis: No  myalgias: No  nasal congestion: Yes  postnasal drip: No  rash: No  rhinorrhea: Yes  shortness of breath: No  sore throat: Yes  sweats: No  weight loss: No  wheezing: No  Aggravated by: nothing

## 2023-03-14 NOTE — ASSESSMENT & PLAN NOTE
Patient will continue to increase fluids, take medication as prescribed and monitor for worsening symptoms.

## 2023-03-14 NOTE — ASSESSMENT & PLAN NOTE
Patient to take medications associated with this visit.  Patient may also take OTC analgesic/antipyretic if needed for pain/fever.  Advised to increase oral fluid intake as tolerated.

## 2023-03-29 ENCOUNTER — OFFICE VISIT (OUTPATIENT)
Dept: PRIMARY CARE | Facility: CLINIC | Age: 63
End: 2023-03-29
Payer: COMMERCIAL

## 2023-03-29 VITALS
DIASTOLIC BLOOD PRESSURE: 86 MMHG | WEIGHT: 296 LBS | RESPIRATION RATE: 16 BRPM | OXYGEN SATURATION: 94 % | BODY MASS INDEX: 39.23 KG/M2 | HEART RATE: 60 BPM | TEMPERATURE: 96.2 F | HEIGHT: 73 IN | SYSTOLIC BLOOD PRESSURE: 136 MMHG

## 2023-03-29 DIAGNOSIS — G89.29 CHRONIC BILATERAL LOW BACK PAIN WITHOUT SCIATICA: ICD-10-CM

## 2023-03-29 DIAGNOSIS — E66.01 CLASS 2 SEVERE OBESITY DUE TO EXCESS CALORIES WITH SERIOUS COMORBIDITY AND BODY MASS INDEX (BMI) OF 39.0 TO 39.9 IN ADULT (MULTI): ICD-10-CM

## 2023-03-29 DIAGNOSIS — M54.50 CHRONIC BILATERAL LOW BACK PAIN WITHOUT SCIATICA: ICD-10-CM

## 2023-03-29 DIAGNOSIS — F51.04 CHRONIC INSOMNIA: ICD-10-CM

## 2023-03-29 DIAGNOSIS — J01.00 ACUTE NON-RECURRENT MAXILLARY SINUSITIS: ICD-10-CM

## 2023-03-29 DIAGNOSIS — M19.91 PRIMARY LOCALIZED OSTEOARTHROSIS OF MULTIPLE SITES: ICD-10-CM

## 2023-03-29 DIAGNOSIS — I10 ESSENTIAL HYPERTENSION: Primary | ICD-10-CM

## 2023-03-29 DIAGNOSIS — K21.9 GASTROESOPHAGEAL REFLUX DISEASE WITHOUT ESOPHAGITIS: ICD-10-CM

## 2023-03-29 DIAGNOSIS — J20.9 ACUTE BRONCHITIS, UNSPECIFIED ORGANISM: ICD-10-CM

## 2023-03-29 PROBLEM — E78.5 DYSLIPIDEMIA: Status: ACTIVE | Noted: 2023-03-29

## 2023-03-29 PROCEDURE — 3075F SYST BP GE 130 - 139MM HG: CPT | Performed by: FAMILY MEDICINE

## 2023-03-29 PROCEDURE — 3079F DIAST BP 80-89 MM HG: CPT | Performed by: FAMILY MEDICINE

## 2023-03-29 PROCEDURE — 3008F BODY MASS INDEX DOCD: CPT | Performed by: FAMILY MEDICINE

## 2023-03-29 PROCEDURE — 1036F TOBACCO NON-USER: CPT | Performed by: FAMILY MEDICINE

## 2023-03-29 PROCEDURE — 99214 OFFICE O/P EST MOD 30 MIN: CPT | Performed by: FAMILY MEDICINE

## 2023-03-29 RX ORDER — VALSARTAN AND HYDROCHLOROTHIAZIDE 160; 12.5 MG/1; MG/1
1 TABLET, FILM COATED ORAL DAILY
Qty: 90 TABLET | Refills: 1 | Status: SHIPPED | OUTPATIENT
Start: 2023-03-29 | End: 2023-06-29 | Stop reason: SDUPTHER

## 2023-03-29 RX ORDER — LEVOFLOXACIN 500 MG/1
500 TABLET, FILM COATED ORAL DAILY
Qty: 10 TABLET | Refills: 0 | Status: SHIPPED | OUTPATIENT
Start: 2023-03-29 | End: 2023-04-08

## 2023-03-29 RX ORDER — HYDROCODONE BITARTRATE AND ACETAMINOPHEN 5; 325 MG/1; MG/1
1 TABLET ORAL EVERY 6 HOURS PRN
Qty: 100 TABLET | Refills: 0 | Status: SHIPPED | OUTPATIENT
Start: 2023-03-29 | End: 2023-04-28

## 2023-03-29 RX ORDER — PROMETHAZINE HYDROCHLORIDE AND DEXTROMETHORPHAN HYDROBROMIDE 6.25; 15 MG/5ML; MG/5ML
5 SYRUP ORAL EVERY 4 HOURS PRN
Qty: 200 ML | Refills: 0 | Status: SHIPPED | OUTPATIENT
Start: 2023-03-29 | End: 2023-04-08

## 2023-03-29 RX ORDER — ZOLPIDEM TARTRATE 10 MG/1
10 TABLET ORAL NIGHTLY PRN
Qty: 30 TABLET | Refills: 2 | Status: SHIPPED | OUTPATIENT
Start: 2023-03-29 | End: 2023-06-29 | Stop reason: SDUPTHER

## 2023-03-29 ASSESSMENT — PATIENT HEALTH QUESTIONNAIRE - PHQ9
SUM OF ALL RESPONSES TO PHQ9 QUESTIONS 1 AND 2: 0
2. FEELING DOWN, DEPRESSED OR HOPELESS: NOT AT ALL
1. LITTLE INTEREST OR PLEASURE IN DOING THINGS: NOT AT ALL

## 2023-03-29 ASSESSMENT — LIFESTYLE VARIABLES: TOTAL SCORE: 3

## 2023-03-29 NOTE — PROGRESS NOTES
Chief Complaint   Patient presents with    Follow-up     3 month follow up on hypertension, chronic back pain, insomnia and other chronic medical conditions     Patient is here for follow-up on hypertension and hyperlipidemia. He is exercising and is adherent to a low-salt diet. Patient denies chest pain, dyspnea, exertional chest pressure/discomfort, near-syncope, orthopnea, palpitations, paroxysmal nocturnal dyspnea, and syncope. Taking his medication regularly with no side effects.    He is also here to follow-up on his chronic low back pain.  Has history of lumbar spondylolsis with myelopathy which has been chronic. Has recurrent tingling and numbness of the lower extremities. He has failed conservative treatment in the past including physical therapy, NSAIDS, multiple epidural injection and has been to pain management. He has been taking the medication as prescribed with no evidence of abuse.     Patient also complains of symptoms of a URI. Symptoms include congestion, nasal congestion, post nasal drip, sinus pressure, and cough  Onset of symptoms was a week ago, and has been gradually worsening since that time. Treatment to date: none.     OARRS:  Griffin Sharma MD on 3/29/2023  8:44 AM  I have personally reviewed the OARRS report for Terry Lee. I have considered the risks of abuse, dependence, addiction and diversion    Is the patient prescribed a combination of a benzodiazepine and opioid?  No    Last Urine Drug Screen / ordered today: No  Recent Results (from the past 07080 hour(s))   Zolpidem Confirmation, Urine    Collection Time: 09/27/21 10:27 AM   Result Value Ref Range    Zolpidem 26 (A) Cutoff <25 ng/mL    Zolpidem Metabolite (ZCA) >1000 (A) Cutoff <25 ng/mL     N/A    Controlled Substance Agreement:  Date of the Last Agreement: 3/29/2023  Reviewed Controlled Substance Agreement including but not limited to the benefits, risks, and alternatives to treatment with a Controlled Substance  medication(s).    Opioids:  What is the patient's goal of therapy? Pain control and to be able to walk and perform activities of daily living without pain     Is this being achieved with current treatment? yes    I have calculated the patient's Morphine Dose Equivalent (MED):   I have considered referral to Pain Management and/or a specialist, and do not feel it is necessary at this time.    I feel that it is clinically indicated to continue this current medication regimen after consideration of alternative therapies, and other non-opioid treatment.    Opioid Risk Screening:  Family History of Substance Abuse  Alcohol: 3 (3/29/2023  9:00 AM)  Illegal Drugs: 0 (3/29/2023  9:00 AM)  Prescription Drugs: 0 (3/29/2023  9:00 AM)    Personal History of Substance Abuse  Alcohol: 0 (3/29/2023  9:00 AM)  Illegal Drugs: 0 (3/29/2023  9:00 AM)  Prescription Drugs: 0 (3/29/2023  9:00 AM)    Patient Age (16-45)  Age (16-45): 0 (3/29/2023  9:00 AM)    History of Preadolescent Sexual Abuse  History of Preadolescent Sexual Abuse: .0 (3/29/2023  9:00 AM)    Psychological Disease  Attention Deficit Disorder, Obsessive Compulsive Disorder, Bipolar, Schizophrenia: 0 (3/29/2023  9:00 AM)  Depression: 0 (3/29/2023  9:00 AM)    Total Score  Total Score: 3 (3/29/2023  9:00 AM)    Total Score Risk Category  TOTAL SCORE CATEGORY: Low Risk (0-3) (3/29/2023  9:00 AM)      Pain Assessment:  Analgesia  What was your pain level on average during the past week?: 3  What was your pain level at its worst during the past week?: 8  What percentage of your pain has been relieved during the past week?: 80 %  Is the amount of pain relief you are now obtaining from your current pain relievers enough to make a real difference in your life?: Y  Query to Clinician: Is the patient's pain relief clinically significant?: Yes    Activities of Daily Living  Physical Functioning: Better  Family Relationships: Better  Social Relationships: Better  Mood:  Better  Sleep Patterns: Better  Overall Functioning: Better    Adverse Events  Is patient experiencing any side effects from current pain relievers?: N      Assessment  Is your overall impression that this patient is benefiting from opioid therapy?: Yes  Specific Analgesic Plan: Continue present regimen      and Sleep Aids:   What is the patient's goal of therapy? To be able to get uninterrupted sleep for 6-8 hours during the night without being unduly tired, dizzy or drowsy during the day    Is this being achieved with current treatment? yes    Activities of Daily Living:   Is your overall impression that this patient is benefiting (symptom reduction outweighs side effects) from sleep aid therapy? Yes     1. Physical Functioning: Better  2. Family Relationship: Better  3. Social Relationship: Better  4. Mood: Better  5. Sleep Patterns: Better  6. Overall Function: Better    Patient Active Problem List   Diagnosis    Acute non-recurrent maxillary sinusitis    Benign prostatic hyperplasia (BPH) with urinary urge incontinence    Cholecystitis    Chronic bilateral low back pain without sciatica    Chronic insomnia    Cough    Essential hypertension    Generalized arthritis    GERD (gastroesophageal reflux disease)    Infected sebaceous cyst    Intra-abdominal abscess (CMS/HCC)    Knee pain    Laceration of right great toe without foreign body present or damage to nail    Left flank pain    Left nephrolithiasis    Mass of kidney    Leg edema, left    Other chest pain    Penile cyst    Piriformis syndrome of both sides    Positive colorectal cancer screening using Cologuard test    Primary localized osteoarthrosis of multiple sites    Knee osteoarthritis    Prostatism    Renal colic on left side    Shoulder contusion    Shoulder pain    Sinus congestion    SOB (shortness of breath) on exertion    Tendinitis of right foot    Urinary hesitancy    Varicose veins of left lower extremity with edema    Class 2 obesity with  body mass index (BMI) of 39.0 to 39.9 in adult    Acute bronchitis    Dyslipidemia    Primary osteoarthritis of both knees    Sleep apnea           Past Medical History:   Diagnosis Date    Benign prostatic hyperplasia with lower urinary tract symptoms     Benign localized hyperplasia of prostate with urinary obstruction and lower urinary tract symptoms    Personal history of other diseases of the circulatory system     History of hypertension    Personal history of other endocrine, nutritional and metabolic disease     History of vitamin D deficiency    Personal history of other specified conditions     History of gross hematuria        Current Outpatient Medications   Medication Sig Dispense Refill    acetaminophen (Tylenol) 325 mg tablet Take 1 tablet (325 mg) by mouth every 6 hours if needed.      cyanocobalamin, vitamin B-12, (VITAMIN B-12 ORAL)       cyanocobalamin, vitamin B-12, 1,000 mcg tablet, sublingual Place under the tongue.      famotidine (Pepcid) 20 mg tablet Take 1 tablet (20 mg) by mouth once daily.      finasteride (Proscar) 5 mg tablet Take 1 tablet (5 mg) by mouth once daily.      multivit with minerals/lutein (MULTIVITAMIN 50 PLUS ORAL) Take 1 tablet by mouth once daily.      multivitamin tablet Multi Vitamin Daily TABS   Refills: 0       Active      naloxone (Narcan) 4 mg/0.1 mL nasal spray Use as directed      tadalafil (Cialis) 2.5 mg tablet Take 1 tablet 1 hour prior to activity      tamsulosin (Flomax) 0.4 mg 24 hr capsule Take 1 capsule (0.4 mg) by mouth once daily.      HYDROcodone-acetaminophen (Norco) 5-325 mg tablet Take 1 tablet by mouth every 6 hours if needed for severe pain (7 - 10). 100 tablet 0    levoFLOXacin (Levaquin) 500 mg tablet Take 1 tablet (500 mg) by mouth once daily for 10 days. 10 tablet 0    promethazine-DM (Phenergan-DM) 6.25-15 mg/5 mL syrup Take 5 mL by mouth every 4 hours if needed for cough for up to 10 days. 200 mL 0    valsartan-hydrochlorothiazide  "(Diovan-HCT) 160-12.5 mg tablet Take 1 tablet by mouth once daily. 90 tablet 1    zolpidem (Ambien) 10 mg tablet Take 1 tablet (10 mg) by mouth as needed at bedtime for sleep. 30 tablet 2     No current facility-administered medications for this visit.       No Known Allergies    Social History     Tobacco Use    Smoking status: Never    Smokeless tobacco: Never   Vaping Use    Vaping status: Not on file   Substance Use Topics    Alcohol use: Never        Review of Systems - General ROS: negative for - fatigue, fever, malaise, night sweats, sleep disturbance or weight loss  Psychological ROS: negative for - anxiety, concentration difficulties, depression, memory difficulties or sleep disturbances  Hematological and Lymphatic ROS: negative for - bruising, fatigue, night sweats or pallor  Endocrine ROS: negative for - hot flashes, malaise/lethargy, palpitations, polydipsia/polyuria, skin changes, temperature intolerance or unexpected weight changes  Respiratory ROS: negative for - hemoptysis, pleuritic pain, shortness of breath or wheezing  Cardiovascular ROS: no chest pain or dyspnea on exertion  Gastrointestinal ROS: no abdominal pain, change in bowel habits, or black or bloody stools  Genito-Urinary ROS: no dysuria, trouble voiding, or hematuria  Neurological ROS: negative for - dizziness, gait disturbance, headaches, impaired coordination/balance, numbness/tingling, tremors or visual changes  Dermatological ROS: negative for - dry skin, lumps, pruritus or rash      Objective:  Blood pressure 136/86, pulse 60, temperature 35.7 °C (96.2 °F), resp. rate 16, height 1.854 m (6' 1\"), weight 134 kg (296 lb), SpO2 94 %.    Physical Examination: General appearance - alert, well appearing, and in no distress  Mental status - alert, oriented to person, place, and time  Eyes - pupils equal and reactive, extraocular eye movements intact  Ears - bilateral TM's and external ear canals normal  Mouth - mucous membranes moist, " pharynx normal without lesions  Neck The neck is supple and free of adenopathy or masses, the thyroid is normal without enlargement or nodules.  Lymphatics - no palpable lymphadenopathy, no hepatosplenomegaly  Chest - clear to auscultation, no wheezes, rales or rhonchi, symmetric air entry  Heart - normal rate, regular rhythm, normal S1, S2, no murmurs, rubs, clicks or gallops  Abdomen - soft, nontender, nondistended, no masses or organomegaly  Neurological - alert, oriented, normal speech, no focal findings or movement disorder noted  Extremities: peripheral pulses normal, no pedal edema, no clubbing or cyanosis, intact peripheral pulses.  Neurological exam reveals alert, oriented, normal speech, no focal findings or movement disorder noted, cranial nerves II through XII intact, motor and sensory grossly normal bilaterally, no focal deficit..    Orders Only on 03/02/2023   Component Date Value Ref Range Status    WBC, Urine 03/02/2023 4  0 - 5 /HPF Final    RBC, Urine 03/02/2023 3  0 - 5 /HPF Final    Squamous Epithelial Cells, Urine 03/02/2023 <1  /HPF Final    Bacteria, Urine 03/02/2023 1+ (A)  /HPF Final    Mucus, Urine 03/02/2023 1+  /LPF Final    Hyaline Casts, Urine 03/02/2023 1+ (A)  /LPF Final         Assessment / Plan:  Problem List Items Addressed This Visit       Acute bronchitis    Current Assessment & Plan     Recommend liberal oral fluid intake.  Call if symptoms fail to improve as expected.    Follow-up if persistent or worsening symptoms otherwise when necessary.           Relevant Medications    promethazine-DM (Phenergan-DM) 6.25-15 mg/5 mL syrup    levoFLOXacin (Levaquin) 500 mg tablet    Acute non-recurrent maxillary sinusitis    Current Assessment & Plan     Recommend liberal oral fluid intake.  Call if symptoms fail to improve as expected.    Follow-up if persistent or worsening symptoms otherwise when necessary.           Relevant Medications    promethazine-DM (Phenergan-DM) 6.25-15 mg/5 mL  "syrup    levoFLOXacin (Levaquin) 500 mg tablet    Chronic bilateral low back pain without sciatica    Relevant Medications    HYDROcodone-acetaminophen (Norco) 5-325 mg tablet    Other Relevant Orders    Follow Up In [facility] - PCP    Chronic insomnia    Relevant Medications    zolpidem (Ambien) 10 mg tablet    Other Relevant Orders    Follow Up In [facility] - PCP    Class 2 obesity with body mass index (BMI) of 39.0 to 39.9 in adult    Current Assessment & Plan     Continue decrease calorie diet and not more than 1500 calorie per day diet and low-fat diet.  Continue with regular exercise program.  We advised exercise at least 5 days a week for at least 45 minutes and also a minimum of 10,000 steps a day.  The detrimental effects of obesity on health were discussed.           Essential hypertension - Primary    Current Assessment & Plan     Dietary sodium restriction.  Regular aerobic exercise program is recommended to help achieve and maintain normal body weight, fitness and improve lipid balance. .  Dietary changes: Increase soluble fiber  Plant sterols 2grams per day (e.g. Benecol)  Reduce saturated fat, \"trans\" monounsaturated fatty acids, and cholesterol           Relevant Medications    valsartan-hydrochlorothiazide (Diovan-HCT) 160-12.5 mg tablet    Other Relevant Orders    Follow Up In [facility] - PCP    GERD (gastroesophageal reflux disease)    Primary localized osteoarthrosis of multiple sites    Relevant Orders    Follow Up In [facility] - PCP        Outpatient Encounter Medications as of 3/29/2023   Medication Sig Dispense Refill    acetaminophen (Tylenol) 325 mg tablet Take 1 tablet (325 mg) by mouth every 6 hours if needed.      cyanocobalamin, vitamin B-12, (VITAMIN B-12 ORAL)       cyanocobalamin, vitamin B-12, 1,000 mcg tablet, sublingual Place under the tongue.      famotidine (Pepcid) 20 mg tablet Take 1 tablet (20 mg) by mouth once daily.      " finasteride (Proscar) 5 mg tablet Take 1 tablet (5 mg) by mouth once daily.      multivit with minerals/lutein (MULTIVITAMIN 50 PLUS ORAL) Take 1 tablet by mouth once daily.      multivitamin tablet Multi Vitamin Daily TABS   Refills: 0       Active      naloxone (Narcan) 4 mg/0.1 mL nasal spray Use as directed      tadalafil (Cialis) 2.5 mg tablet Take 1 tablet 1 hour prior to activity      tamsulosin (Flomax) 0.4 mg 24 hr capsule Take 1 capsule (0.4 mg) by mouth once daily.      [DISCONTINUED] HYDROcodone-acetaminophen (Norco) 5-325 mg tablet Take 1 tablet by mouth every 6 hours if needed (Pain).      [DISCONTINUED] valsartan-hydrochlorothiazide (Diovan-HCT) 160-12.5 mg tablet Take 1 tablet by mouth once daily.      [DISCONTINUED] zolpidem (Ambien) 10 mg tablet Take 1 tablet (10 mg) by mouth as needed at bedtime for sleep.      [] amoxicillin (Amoxil) 875 mg tablet Take 1 tablet (875 mg) by mouth in the morning and 1 tablet (875 mg) before bedtime. Do all this for 10 days. 20 tablet 0    HYDROcodone-acetaminophen (Norco) 5-325 mg tablet Take 1 tablet by mouth every 6 hours if needed for severe pain (7 - 10). 100 tablet 0    levoFLOXacin (Levaquin) 500 mg tablet Take 1 tablet (500 mg) by mouth once daily for 10 days. 10 tablet 0    promethazine-DM (Phenergan-DM) 6.25-15 mg/5 mL syrup Take 5 mL by mouth every 4 hours if needed for cough for up to 10 days. 200 mL 0    valsartan-hydrochlorothiazide (Diovan-HCT) 160-12.5 mg tablet Take 1 tablet by mouth once daily. 90 tablet 1    zolpidem (Ambien) 10 mg tablet Take 1 tablet (10 mg) by mouth as needed at bedtime for sleep. 30 tablet 2     No facility-administered encounter medications on file as of 3/29/2023.      Scribe Attestation  By signing my name below, I, Brittney Hsu   attest that this documentation has been prepared under the direction and in the presence of Griffin Sharma MD.

## 2023-03-29 NOTE — PATIENT INSTRUCTIONS
BMI was above normal measurement. Current weight: 134 kg (296 lb)  Weight change since last visit (-) denotes wt loss -5.25 lbs   Weight loss needed to achieve BMI 25: 106.9 Lbs  Weight loss needed to achieve BMI 30: 69.1 Lbs  Advised to Increase physical activity.

## 2023-03-29 NOTE — ASSESSMENT & PLAN NOTE
Recommend liberal oral fluid intake.  Call if symptoms fail to improve as expected.    Follow-up if persistent or worsening symptoms otherwise when necessary.

## 2023-05-07 DIAGNOSIS — K21.9 GASTRO-ESOPHAGEAL REFLUX DISEASE WITHOUT ESOPHAGITIS: ICD-10-CM

## 2023-05-08 RX ORDER — FAMOTIDINE 20 MG/1
TABLET, FILM COATED ORAL
Qty: 90 TABLET | Refills: 2 | Status: SHIPPED | OUTPATIENT
Start: 2023-05-08 | End: 2023-06-29 | Stop reason: SDUPTHER

## 2023-05-08 NOTE — TELEPHONE ENCOUNTER
Rx Refill Request     Name: Terry Lee  :  1960     Specific Pharmacy location:  [facility]   Date of last appointment:  3/29/2023   Date of next appointment:  2023   Best number to reach patient:  [phone]

## 2023-06-01 DIAGNOSIS — N40.1 BENIGN PROSTATIC HYPERPLASIA WITH LOWER URINARY TRACT SYMPTOMS: ICD-10-CM

## 2023-06-01 DIAGNOSIS — N39.41 URGE INCONTINENCE: ICD-10-CM

## 2023-06-01 RX ORDER — TAMSULOSIN HYDROCHLORIDE 0.4 MG/1
CAPSULE ORAL
Qty: 90 CAPSULE | Refills: 1 | Status: SHIPPED | OUTPATIENT
Start: 2023-06-01 | End: 2023-12-29 | Stop reason: SDUPTHER

## 2023-06-29 ENCOUNTER — OFFICE VISIT (OUTPATIENT)
Dept: PRIMARY CARE | Facility: CLINIC | Age: 63
End: 2023-06-29
Payer: COMMERCIAL

## 2023-06-29 VITALS
HEIGHT: 73 IN | BODY MASS INDEX: 40.02 KG/M2 | WEIGHT: 302 LBS | OXYGEN SATURATION: 98 % | DIASTOLIC BLOOD PRESSURE: 78 MMHG | TEMPERATURE: 97.7 F | RESPIRATION RATE: 16 BRPM | SYSTOLIC BLOOD PRESSURE: 128 MMHG | HEART RATE: 53 BPM

## 2023-06-29 DIAGNOSIS — Z12.5 ENCOUNTER FOR SCREENING FOR MALIGNANT NEOPLASM OF PROSTATE: ICD-10-CM

## 2023-06-29 DIAGNOSIS — E66.01 CLASS 2 SEVERE OBESITY DUE TO EXCESS CALORIES WITH SERIOUS COMORBIDITY AND BODY MASS INDEX (BMI) OF 39.0 TO 39.9 IN ADULT (MULTI): ICD-10-CM

## 2023-06-29 DIAGNOSIS — K21.9 GASTRO-ESOPHAGEAL REFLUX DISEASE WITHOUT ESOPHAGITIS: ICD-10-CM

## 2023-06-29 DIAGNOSIS — I10 ESSENTIAL HYPERTENSION: Primary | ICD-10-CM

## 2023-06-29 DIAGNOSIS — F51.04 CHRONIC INSOMNIA: ICD-10-CM

## 2023-06-29 DIAGNOSIS — M19.91 PRIMARY LOCALIZED OSTEOARTHROSIS OF MULTIPLE SITES: ICD-10-CM

## 2023-06-29 DIAGNOSIS — M54.50 CHRONIC BILATERAL LOW BACK PAIN WITHOUT SCIATICA: ICD-10-CM

## 2023-06-29 DIAGNOSIS — G89.29 CHRONIC BILATERAL LOW BACK PAIN WITHOUT SCIATICA: ICD-10-CM

## 2023-06-29 DIAGNOSIS — Z79.899 MEDICATION MANAGEMENT: ICD-10-CM

## 2023-06-29 PROBLEM — K65.1 INTRA-ABDOMINAL ABSCESS (MULTI): Status: RESOLVED | Noted: 2023-02-15 | Resolved: 2023-06-29

## 2023-06-29 LAB
POC AMPHETAMINE: NEGATIVE NG/ML
POC BARBITURATES: NEGATIVE NG/ML
POC BENZODIAZEPINES: NEGATIVE NG/ML
POC BUPRENORPHINE URINE: NEGATIVE NG/ML
POC COCAINE: NEGATIVE NG/ML
POC MDMA (NG/ML) IN URINE: NEGATIVE NG/ML
POC METHADONE MANUALLY ENTERED: NEGATIVE NG/ML
POC METHAMPHETAMINE: NEGATIVE NG/ML
POC MORPHINE URINE: NEGATIVE NG/ML
POC OPIATES: NEGATIVE NG/ML
POC OXYCODONE: NEGATIVE NG/ML
POC PHENCYCLIDINE (PCP): NEGATIVE NG/ML
POC THC: NEGATIVE NG/ML
POC TICYCLIC ANTIDEPRESSANTS (TCA): NEGATIVE NG/ML

## 2023-06-29 PROCEDURE — 3008F BODY MASS INDEX DOCD: CPT | Performed by: FAMILY MEDICINE

## 2023-06-29 PROCEDURE — 99214 OFFICE O/P EST MOD 30 MIN: CPT | Performed by: FAMILY MEDICINE

## 2023-06-29 PROCEDURE — 1036F TOBACCO NON-USER: CPT | Performed by: FAMILY MEDICINE

## 2023-06-29 PROCEDURE — 3074F SYST BP LT 130 MM HG: CPT | Performed by: FAMILY MEDICINE

## 2023-06-29 PROCEDURE — 80305 DRUG TEST PRSMV DIR OPT OBS: CPT | Performed by: FAMILY MEDICINE

## 2023-06-29 PROCEDURE — 80368 SEDATIVE HYPNOTICS: CPT

## 2023-06-29 PROCEDURE — 3078F DIAST BP <80 MM HG: CPT | Performed by: FAMILY MEDICINE

## 2023-06-29 RX ORDER — SEMAGLUTIDE 1.34 MG/ML
INJECTION, SOLUTION SUBCUTANEOUS
Qty: 1 EACH | Refills: 2 | Status: SHIPPED | OUTPATIENT
Start: 2023-06-29 | End: 2023-09-29 | Stop reason: WASHOUT

## 2023-06-29 RX ORDER — HYDROCODONE BITARTRATE AND ACETAMINOPHEN 5; 325 MG/1; MG/1
1 TABLET ORAL EVERY 6 HOURS PRN
COMMUNITY
Start: 2020-09-28 | End: 2023-06-29 | Stop reason: SDUPTHER

## 2023-06-29 RX ORDER — FAMOTIDINE 20 MG/1
20 TABLET, FILM COATED ORAL DAILY
Qty: 90 TABLET | Refills: 2 | Status: SHIPPED | OUTPATIENT
Start: 2023-06-29 | End: 2024-02-25

## 2023-06-29 RX ORDER — ZOLPIDEM TARTRATE 10 MG/1
10 TABLET ORAL NIGHTLY PRN
Qty: 30 TABLET | Refills: 2 | Status: SHIPPED | OUTPATIENT
Start: 2023-06-29 | End: 2023-09-29 | Stop reason: SDUPTHER

## 2023-06-29 RX ORDER — HYDROCODONE BITARTRATE AND ACETAMINOPHEN 5; 325 MG/1; MG/1
1 TABLET ORAL EVERY 6 HOURS PRN
Qty: 100 TABLET | Refills: 0 | Status: SHIPPED | OUTPATIENT
Start: 2023-06-29 | End: 2023-07-06 | Stop reason: SDUPTHER

## 2023-06-29 RX ORDER — VALSARTAN AND HYDROCHLOROTHIAZIDE 160; 12.5 MG/1; MG/1
1 TABLET, FILM COATED ORAL DAILY
Qty: 90 TABLET | Refills: 1 | Status: SHIPPED | OUTPATIENT
Start: 2023-06-29 | End: 2023-12-29 | Stop reason: SDUPTHER

## 2023-06-29 ASSESSMENT — PATIENT HEALTH QUESTIONNAIRE - PHQ9
SUM OF ALL RESPONSES TO PHQ9 QUESTIONS 1 AND 2: 1
1. LITTLE INTEREST OR PLEASURE IN DOING THINGS: NOT AT ALL
2. FEELING DOWN, DEPRESSED OR HOPELESS: SEVERAL DAYS
10. IF YOU CHECKED OFF ANY PROBLEMS, HOW DIFFICULT HAVE THESE PROBLEMS MADE IT FOR YOU TO DO YOUR WORK, TAKE CARE OF THINGS AT HOME, OR GET ALONG WITH OTHER PEOPLE: NOT DIFFICULT AT ALL

## 2023-06-29 ASSESSMENT — LIFESTYLE VARIABLES: TOTAL SCORE: 0

## 2023-06-29 NOTE — PROGRESS NOTES
Chief Complaint   Patient presents with    Follow-up     Hypertension, Chronic back pain, Insomnia, and other chronic medical conditions     Patient is here for follow-up on hypertension and other chronic medical conditions. He is exercising and is adherent to a low-salt diet. Blood pressure is well controlled at home. Cardiac symptoms: none. Patient denies chest pain, dyspnea, exertional chest pressure/discomfort, near-syncope, orthopnea, palpitations, paroxysmal nocturnal dyspnea, and syncope.   Use of agents associated with hypertension: none. History of target organ damage: none.    He presents today to follow up on insomnia. Onset was several  years  ago. Patient describes symptoms as frequent night time awakening and difficulty falling asleep. Associated symptoms include: none. Patient denies anxiety, leg cramps, and restless legs. Symptoms have been well-controlled.    He also presents to follow up on chronic low back pain. Current symptoms include: pain in back  (aching in character; 3/10 in severity). Symptoms have been stable from the previous visit. Exacerbating factors identified by the patient are none.    He is also here for discussion regarding weight loss. He has noted a weight gain of approximately 14 pounds over the last 1 year. History of eating disorders: none. There is a family history positive for obesity in the patient. Previous treatments for obesity include self-directed dieting. Obesity associated medical conditions: hypertension. Obesity associated medications: none. Cardiovascular risk factors besides obesity: advanced age (older than 55 for men, 65 for women), hypertension, male gender, and obesity (BMI >= 30 kg/m2).    Patient Active Problem List   Diagnosis    Acute non-recurrent maxillary sinusitis    Benign prostatic hyperplasia (BPH) with urinary urge incontinence    Cholecystitis    Chronic bilateral low back pain without sciatica    Chronic insomnia    Cough    Essential  hypertension    Generalized arthritis    GERD (gastroesophageal reflux disease)    Infected sebaceous cyst    Knee pain    Laceration of right great toe without foreign body present or damage to nail    Left flank pain    Left nephrolithiasis    Mass of kidney    Leg edema, left    Other chest pain    Penile cyst    Piriformis syndrome of both sides    Positive colorectal cancer screening using Cologuard test    Primary localized osteoarthrosis of multiple sites    Knee osteoarthritis    Prostatism    Renal colic on left side    Shoulder contusion    Shoulder pain    Sinus congestion    SOB (shortness of breath) on exertion    Tendinitis of right foot    Urinary hesitancy    Varicose veins of left lower extremity with edema    Class 2 obesity with body mass index (BMI) of 39.0 to 39.9 in adult    Acute bronchitis    Dyslipidemia    Primary osteoarthritis of both knees    Sleep apnea           Past Medical History:   Diagnosis Date    Benign prostatic hyperplasia with lower urinary tract symptoms     Benign localized hyperplasia of prostate with urinary obstruction and lower urinary tract symptoms    Intra-abdominal abscess (CMS/HCC) 02/15/2023    Personal history of other diseases of the circulatory system     History of hypertension    Personal history of other endocrine, nutritional and metabolic disease     History of vitamin D deficiency    Personal history of other specified conditions     History of gross hematuria        Current Outpatient Medications   Medication Sig Dispense Refill    acetaminophen (Tylenol) 325 mg tablet Take 1 tablet (325 mg) by mouth every 6 hours if needed.      cyanocobalamin, vitamin B-12, (VITAMIN B-12 ORAL)       finasteride (Proscar) 5 mg tablet Take 1 tablet (5 mg) by mouth once daily.      multivit with minerals/lutein (MULTIVITAMIN 50 PLUS ORAL) Take 1 tablet by mouth once daily.      tadalafil (Cialis) 2.5 mg tablet Take 1 tablet 1 hour prior to activity      tamsulosin  (Flomax) 0.4 mg 24 hr capsule TAKE 1 CAPSULE BY MOUTH EVERY DAY 90 capsule 1    cyanocobalamin, vitamin B-12, 1,000 mcg tablet, sublingual Place under the tongue.      famotidine (Pepcid) 20 mg tablet Take 1 tablet (20 mg) by mouth once daily. 90 tablet 2    HYDROcodone-acetaminophen (Norco) 5-325 mg tablet Take 1 tablet by mouth every 6 hours if needed for severe pain (7 - 10) (for 30 days). 100 tablet 0    multivitamin tablet Multi Vitamin Daily TABS   Refills: 0       Active      naloxone (Narcan) 4 mg/0.1 mL nasal spray Use as directed      semaglutide (Ozempic) 0.25 mg or 0.5 mg(2 mg/1.5 mL) pen injector Inject 0.25 mg subcutaneous weekly x 4 week then increase to 0.5 mg weekly 1 each 2    valsartan-hydrochlorothiazide (Diovan-HCT) 160-12.5 mg tablet Take 1 tablet by mouth once daily. 90 tablet 1    zolpidem (Ambien) 10 mg tablet Take 1 tablet (10 mg) by mouth as needed at bedtime for sleep. 30 tablet 2     No current facility-administered medications for this visit.       No Known Allergies    Social History     Tobacco Use    Smoking status: Never    Smokeless tobacco: Never   Substance Use Topics    Alcohol use: Never      Review of Systems - General ROS: negative for - fatigue, fever, malaise, night sweats, or weight loss  ENT ROS: negative for - hearing change, nasal discharge, oral lesions, sinus pain, sore throat, tinnitus or vertigo  Allergy and Immunology ROS: negative for - hives, nasal congestion or seasonal allergies  Hematological and Lymphatic ROS: negative for - bruising, fatigue, night sweats or pallor  Endocrine ROS: negative for - hot flashes, malaise/lethargy, palpitations, polydipsia/polyuria, skin changes, or temperature intolerance  Respiratory ROS: negative for - cough, hemoptysis, pleuritic pain, shortness of breath or wheezing  Cardiovascular ROS: no chest pain or dyspnea on exertion  Gastrointestinal ROS: no abdominal pain, change in bowel habits, or black or bloody  "stools  Genito-Urinary ROS: no dysuria, trouble voiding, or hematuria  Neurological ROS: negative for - dizziness, gait disturbance, headaches, impaired coordination/balance, numbness/tingling, tremors or visual changes  Dermatological ROS: negative for - dry skin, lumps, pruritus or rash    Objective:  Blood pressure 128/78, pulse 53, temperature 36.5 °C (97.7 °F), resp. rate 16, height 1.854 m (6' 1\"), weight 137 kg (302 lb), SpO2 98 %.    Physical Examination: General appearance - alert, well appearing, and in no distress  Mental status - alert, oriented to person, place, and time  Eyes - pupils equal and reactive, extraocular eye movements intact  Ears - bilateral TM's and external ear canals normal  Mouth - mucous membranes moist, pharynx normal without lesions  Neck The neck is supple and free of adenopathy or masses, the thyroid is normal without enlargement or nodules.  Lymphatics - no palpable lymphadenopathy, no hepatosplenomegaly  Chest - clear to auscultation, no wheezes, rales or rhonchi, symmetric air entry  Heart - normal rate, regular rhythm, normal S1, S2, no murmurs, rubs, clicks or gallops  Abdomen - soft, nontender, nondistended, no masses or organomegaly  Neurological - alert, oriented, normal speech, no focal findings or movement disorder noted  Extremities: peripheral pulses normal, no pedal edema, no clubbing or cyanosis, intact peripheral pulses.  Neurological exam reveals alert, oriented, normal speech, no focal findings or movement disorder noted, cranial nerves II through XII intact, motor and sensory grossly normal bilaterally, no focal deficit..    Orders Only on 03/02/2023   Component Date Value Ref Range Status    WBC, Urine 03/02/2023 4  0 - 5 /HPF Final    RBC, Urine 03/02/2023 3  0 - 5 /HPF Final    Squamous Epithelial Cells, Urine 03/02/2023 <1  /HPF Final    Bacteria, Urine 03/02/2023 1+ (A)  /HPF Final    Mucus, Urine 03/02/2023 1+  /LPF Final    Hyaline Casts, Urine 03/02/2023 " 1+ (A)  /LPF Final         Assessment / Plan:  Problem List Items Addressed This Visit       Chronic bilateral low back pain without sciatica    Current Assessment & Plan       Natural history and expected course discussed. Questions answered.  Educational material distributed.  Proper lifting, bending technique discussed.  Stretching exercises discussed.  Regular aerobic and trunk strengthening exercises discussed.  Ice to affected area as needed for local pain relief.  Heat to affected area as needed for local pain relief.           Relevant Medications    HYDROcodone-acetaminophen (Norco) 5-325 mg tablet    Other Relevant Orders    Follow Up In [facility] - PCP - Established    POCT waived urine drug screen manually resulted (Completed)    Zolpidem Confirmation, Urine    Chronic insomnia    Relevant Medications    zolpidem (Ambien) 10 mg tablet    Other Relevant Orders    Follow Up [facility] - PCP - Established    POCT waived urine drug screen manually resulted (Completed)    Zolpidem Confirmation, Urine    Class 2 obesity with body mass index (BMI) of 39.0 to 39.9 in adult    Current Assessment & Plan     We will start him on Ozempic.  General weight loss/lifestyle modification strategies discussed (elicit support from others; identify saboteurs; non-food rewards, etc).  Behavioral treatment  Diet interventions: diet diary indefinitely, low calorie (1500 kCal/d) deficit diet, and qualitative changes (increase low-fat, high-fiber foods).  Regular aerobic exercise program discussed.  Pharmacotherapy as ordered.            Relevant Medications    semaglutide (Ozempic) 0.25 mg or 0.5 mg(2 mg/1.5 mL) pen injector    Essential hypertension - Primary    Current Assessment & Plan     Dietary sodium restriction.  Regular aerobic exercise program is recommended to help achieve and maintain normal body weight, fitness and improve lipid balance. .  Dietary changes: Increase soluble fiber  Plant  "sterols 2grams per day (e.g. Benecol)  Reduce saturated fat, \"trans\" monounsaturated fatty acids, and cholesterol           Relevant Medications    valsartan-hydrochlorothiazide (Diovan-HCT) 160-12.5 mg tablet    Other Relevant Orders    Follow Up In [facility] - PCP - Established    CBC and Auto Differential    Comprehensive Metabolic Panel    Lipid Panel    Primary localized osteoarthrosis of multiple sites    Relevant Orders    Follow Up [facility] - PCP - Established     Other Visit Diagnoses       Gastro-esophageal reflux disease without esophagitis        Relevant Medications    famotidine (Pepcid) 20 mg tablet    Other Relevant Orders    Follow Up [facility] - PCP - Established    Encounter for screening for malignant neoplasm of prostate        Relevant Orders    Prostate Specific Antigen, Screen    Medication management        Relevant Orders    POCT waived urine drug screen manually resulted (Completed)    Zolpidem Confirmation, Urine             Outpatient Encounter Medications as of 6/29/2023   Medication Sig Dispense Refill    acetaminophen (Tylenol) 325 mg tablet Take 1 tablet (325 mg) by mouth every 6 hours if needed.      cyanocobalamin, vitamin B-12, (VITAMIN B-12 ORAL)       finasteride (Proscar) 5 mg tablet Take 1 tablet (5 mg) by mouth once daily.      multivit with minerals/lutein (MULTIVITAMIN 50 PLUS ORAL) Take 1 tablet by mouth once daily.      tadalafil (Cialis) 2.5 mg tablet Take 1 tablet 1 hour prior to activity      tamsulosin (Flomax) 0.4 mg 24 hr capsule TAKE 1 CAPSULE BY MOUTH EVERY DAY 90 capsule 1    [DISCONTINUED] famotidine (Pepcid) 20 mg tablet TAKE 1 TABLET BY MOUTH EVERY DAY 90 tablet 2    [DISCONTINUED] HYDROcodone-acetaminophen (Norco) 5-325 mg tablet Take 1 tablet by mouth every 6 hours if needed for severe pain (7 - 10).      [DISCONTINUED] valsartan-hydrochlorothiazide (Diovan-HCT) 160-12.5 mg tablet Take 1 tablet by mouth once daily. " 90 tablet 1    [DISCONTINUED] zolpidem (Ambien) 10 mg tablet Take 1 tablet (10 mg) by mouth as needed at bedtime for sleep. 30 tablet 2    cyanocobalamin, vitamin B-12, 1,000 mcg tablet, sublingual Place under the tongue.      famotidine (Pepcid) 20 mg tablet Take 1 tablet (20 mg) by mouth once daily. 90 tablet 2    HYDROcodone-acetaminophen (Norco) 5-325 mg tablet Take 1 tablet by mouth every 6 hours if needed for severe pain (7 - 10) (for 30 days). 100 tablet 0    multivitamin tablet Multi Vitamin Daily TABS   Refills: 0       Active      naloxone (Narcan) 4 mg/0.1 mL nasal spray Use as directed      semaglutide (Ozempic) 0.25 mg or 0.5 mg(2 mg/1.5 mL) pen injector Inject 0.25 mg subcutaneous weekly x 4 week then increase to 0.5 mg weekly 1 each 2    valsartan-hydrochlorothiazide (Diovan-HCT) 160-12.5 mg tablet Take 1 tablet by mouth once daily. 90 tablet 1    zolpidem (Ambien) 10 mg tablet Take 1 tablet (10 mg) by mouth as needed at bedtime for sleep. 30 tablet 2     No facility-administered encounter medications on file as of 6/29/2023.      OARRS:  Griffin Sharma MD on 6/29/2023  8:06 AM  I have personally reviewed the OARRS report for Terry Lee. I have considered the risks of abuse, dependence, addiction and diversion    Is the patient prescribed a combination of a benzodiazepine and opioid?  No    Last Urine Drug Screen / ordered today: Yes  Recent Results (from the past [id number] hour(s))   POCT waived urine drug screen manually resulted    Collection Time: 06/29/23  8:35 AM   Result Value Ref Range    POC THC Negative Negative, Not applicable ng/mL    POC Cocaine Negative Negative, Not applicable ng/mL    POC Opiates Negative Negative, Not applicable ng/mL    POC Amphetamine Negative Negative, Not applicable ng/mL    POC Phencyclidine (PCP) Negative Negative, Not applicable ng/mL    POC Barbiturates Negative Negative, Not applicable ng/mL    POC Benzodiazepines Negative Negative, Not applicable ng/mL     POC Methamphetamine Negative Negative, Not applicable ng/mL    POC METHADONE MANUALLY ENTERED Negative Negative, Not applicable ng/mL    POC Ticyclic Antidepressants (TCA) Negative Negative, Not applicable ng/mL    POC Oxycodone Negative Negative, Not applicable ng/mL    POC MDMA URINE Negative Negative, Not applicable ng/mL    POC Morphine Urine Negative Negative, Not applicable ng/mL    POC Burprenorphine Urine Negative Negative, Not applicable ng/mL   Zolpidem Confirmation, Urine    Collection Time: 09/27/21 10:27 AM   Result Value Ref Range    Zolpidem 26 (A) Cutoff <25 ng/mL    Zolpidem Metabolite (ZCA) >1000 (A) Cutoff <25 ng/mL     Results are as expected.     Controlled Substance Agreement:  Date of the Last Agreement: 3/29/2023  Reviewed Controlled Substance Agreement including but not limited to the benefits, risks, and alternatives to treatment with a Controlled Substance medication(s).    Opioids:  What is the patient's goal of therapy? Pain control and to be able to walk and perform activities of daily living without pain     Is this being achieved with current treatment? yes    I have calculated the patient's Morphine Dose Equivalent (MED):   I have considered referral to Pain Management and/or a specialist, and do not feel it is necessary at this time.    I feel that it is clinically indicated to continue this current medication regimen after consideration of alternative therapies, and other non-opioid treatment.    Opioid Risk Screening:  Family History of Substance Abuse  Alcohol: 0 (6/29/2023  7:00 AM)  Illegal Drugs: 0 (6/29/2023  7:00 AM)  Prescription Drugs: 0 (6/29/2023  7:00 AM)    Personal History of Substance Abuse  Alcohol: 0 (6/29/2023  7:00 AM)  Illegal Drugs: 0 (6/29/2023  7:00 AM)  Prescription Drugs: 0 (6/29/2023  7:00 AM)    Patient Age (16-45)  Age (16-45): 0 (6/29/2023  7:00 AM)    History of Preadolescent Sexual Abuse  History of Preadolescent Sexual Abuse: .0 (6/29/2023  7:00  AM)    Psychological Disease  Attention Deficit Disorder, Obsessive Compulsive Disorder, Bipolar, Schizophrenia: 0 (6/29/2023  7:00 AM)  Depression: 0 (6/29/2023  7:00 AM)    Total Score  Total Score: 0 (6/29/2023  7:00 AM)    Total Score Risk Category  TOTAL SCORE CATEGORY: Low Risk (0-3) (6/29/2023  7:00 AM)    Pain Assessment:  Analgesia  What was your pain level on average during the past week?: 3  What was your pain level at its worst during the past week?: 5  What percentage of your pain has been relieved during the past week?: 50 %  Is the amount of pain relief you are now obtaining from your current pain relievers enough to make a real difference in your life?: Y  Query to Clinician: Is the patient's pain relief clinically significant?: Yes    Activities of Daily Living  Physical Functioning: Same  Family Relationships: Same  Social Relationships: Same  Mood: Same  Sleep Patterns: Same  Overall Functioning: Same    Adverse Events  Is patient experiencing any side effects from current pain relievers?: N      Assessment  Is your overall impression that this patient is benefiting from opioid therapy?: Yes  Specific Analgesic Plan: Continue present regimen     and Sleep Aids:   What is the patient's goal of therapy? To be able to get uninterrupted sleep for 6-8 hours during the night without being unduly tired, dizzy or drowsy during the day    Is this being achieved with current treatment? yes    Activities of Daily Living:   Is your overall impression that this patient is benefiting (symptom reduction outweighs side effects) from sleep aid therapy? Yes     1. Physical Functioning: Better  2. Family Relationship: Better  3. Social Relationship: Better  4. Mood: Better  5. Sleep Patterns: Better  6. Overall Function: Better    Scribe Attestation  By signing my name below, Jairon VERNON Scribe   attest that this documentation has been prepared under the direction and in the presence of Griffin Sharma  MD.

## 2023-06-29 NOTE — PATIENT INSTRUCTIONS
BMI was above normal measurement. Current weight: 137 kg (302 lb)  Weight change since last visit (-) denotes wt loss 6 lbs   Weight loss needed to achieve BMI 25: 112.9 Lbs  Weight loss needed to achieve BMI 30: 75.1 Lbs  Advised to Increase physical activity.

## 2023-06-29 NOTE — ASSESSMENT & PLAN NOTE
We will start him on Ozempic.  General weight loss/lifestyle modification strategies discussed (elicit support from others; identify saboteurs; non-food rewards, etc).  Behavioral treatment  Diet interventions: diet diary indefinitely, low calorie (1500 kCal/d) deficit diet, and qualitative changes (increase low-fat, high-fiber foods).  Regular aerobic exercise program discussed.  Pharmacotherapy as ordered.

## 2023-07-06 ENCOUNTER — TELEPHONE (OUTPATIENT)
Dept: PRIMARY CARE | Facility: CLINIC | Age: 63
End: 2023-07-06
Payer: COMMERCIAL

## 2023-07-06 DIAGNOSIS — G89.29 CHRONIC BILATERAL LOW BACK PAIN WITHOUT SCIATICA: ICD-10-CM

## 2023-07-06 DIAGNOSIS — M54.50 CHRONIC BILATERAL LOW BACK PAIN WITHOUT SCIATICA: ICD-10-CM

## 2023-07-06 LAB
ZOLPIDEM METABOLITE (ZCA): >1000 NG/ML
ZOLPIDEM: 46 NG/ML

## 2023-07-06 RX ORDER — HYDROCODONE BITARTRATE AND ACETAMINOPHEN 5; 325 MG/1; MG/1
1 TABLET ORAL EVERY 6 HOURS PRN
Qty: 100 TABLET | Refills: 0 | Status: SHIPPED | OUTPATIENT
Start: 2023-07-06 | End: 2023-09-29 | Stop reason: SDUPTHER

## 2023-07-06 NOTE — TELEPHONE ENCOUNTER
Patient's wife, Aspen, called in stating [facility] in [place] does not have they hydrocodone 5-325 in stock and doesn't know when they will be getting it back. Aspen called [facility] pharmacy and was informed they do have it in stock but can't take a transferred prescription. Aspen is requesting a new script to be sent to [facility]. Please advise.

## 2023-09-08 ENCOUNTER — LAB (OUTPATIENT)
Dept: LAB | Facility: LAB | Age: 63
End: 2023-09-08
Payer: COMMERCIAL

## 2023-09-08 DIAGNOSIS — I10 ESSENTIAL HYPERTENSION: ICD-10-CM

## 2023-09-08 LAB
ALANINE AMINOTRANSFERASE (SGPT) (U/L) IN SER/PLAS: 22 U/L (ref 10–52)
ALBUMIN (G/DL) IN SER/PLAS: 4 G/DL (ref 3.4–5)
ALKALINE PHOSPHATASE (U/L) IN SER/PLAS: 49 U/L (ref 33–136)
ANION GAP IN SER/PLAS: 12 MMOL/L (ref 10–20)
APPEARANCE, URINE: CLEAR
ASPARTATE AMINOTRANSFERASE (SGOT) (U/L) IN SER/PLAS: 19 U/L (ref 9–39)
BACTERIA, URINE: ABNORMAL /HPF
BASOPHILS (10*3/UL) IN BLOOD BY AUTOMATED COUNT: 0.04 X10E9/L (ref 0–0.1)
BASOPHILS/100 LEUKOCYTES IN BLOOD BY AUTOMATED COUNT: 0.7 % (ref 0–2)
BILIRUBIN TOTAL (MG/DL) IN SER/PLAS: 0.6 MG/DL (ref 0–1.2)
BILIRUBIN, URINE: NEGATIVE
BLOOD, URINE: ABNORMAL
CALCIUM (MG/DL) IN SER/PLAS: 9 MG/DL (ref 8.6–10.3)
CARBON DIOXIDE, TOTAL (MMOL/L) IN SER/PLAS: 29 MMOL/L (ref 21–32)
CHLORIDE (MMOL/L) IN SER/PLAS: 102 MMOL/L (ref 98–107)
CHOLESTEROL (MG/DL) IN SER/PLAS: 181 MG/DL (ref 0–199)
CHOLESTEROL IN HDL (MG/DL) IN SER/PLAS: 75.9 MG/DL
CHOLESTEROL/HDL RATIO: 2.4
COLOR, URINE: YELLOW
CREATININE (MG/DL) IN SER/PLAS: 0.75 MG/DL (ref 0.5–1.3)
EOSINOPHILS (10*3/UL) IN BLOOD BY AUTOMATED COUNT: 0.08 X10E9/L (ref 0–0.7)
EOSINOPHILS/100 LEUKOCYTES IN BLOOD BY AUTOMATED COUNT: 1.4 % (ref 0–6)
ERYTHROCYTE DISTRIBUTION WIDTH (RATIO) BY AUTOMATED COUNT: 14.7 % (ref 11.5–14.5)
ERYTHROCYTE MEAN CORPUSCULAR HEMOGLOBIN CONCENTRATION (G/DL) BY AUTOMATED: 32.6 G/DL (ref 32–36)
ERYTHROCYTE MEAN CORPUSCULAR VOLUME (FL) BY AUTOMATED COUNT: 89 FL (ref 80–100)
ERYTHROCYTES (10*6/UL) IN BLOOD BY AUTOMATED COUNT: 4.56 X10E12/L (ref 4.5–5.9)
GFR MALE: >90 ML/MIN/1.73M2
GLUCOSE (MG/DL) IN SER/PLAS: 89 MG/DL (ref 74–99)
GLUCOSE, URINE: NEGATIVE MG/DL
HEMATOCRIT (%) IN BLOOD BY AUTOMATED COUNT: 40.5 % (ref 41–52)
HEMOGLOBIN (G/DL) IN BLOOD: 13.2 G/DL (ref 13.5–17.5)
IMMATURE GRANULOCYTES/100 LEUKOCYTES IN BLOOD BY AUTOMATED COUNT: 0.3 % (ref 0–0.9)
KETONES, URINE: NEGATIVE MG/DL
LDL: 95 MG/DL (ref 0–99)
LEUKOCYTE ESTERASE, URINE: ABNORMAL
LEUKOCYTES (10*3/UL) IN BLOOD BY AUTOMATED COUNT: 5.9 X10E9/L (ref 4.4–11.3)
LYMPHOCYTES (10*3/UL) IN BLOOD BY AUTOMATED COUNT: 1.11 X10E9/L (ref 1.2–4.8)
LYMPHOCYTES/100 LEUKOCYTES IN BLOOD BY AUTOMATED COUNT: 18.9 % (ref 13–44)
MONOCYTES (10*3/UL) IN BLOOD BY AUTOMATED COUNT: 0.46 X10E9/L (ref 0.1–1)
MONOCYTES/100 LEUKOCYTES IN BLOOD BY AUTOMATED COUNT: 7.8 % (ref 2–10)
MUCUS, URINE: ABNORMAL /LPF
NEUTROPHILS (10*3/UL) IN BLOOD BY AUTOMATED COUNT: 4.16 X10E9/L (ref 1.2–7.7)
NEUTROPHILS/100 LEUKOCYTES IN BLOOD BY AUTOMATED COUNT: 70.9 % (ref 40–80)
NITRITE, URINE: NEGATIVE
PH, URINE: 5 (ref 5–8)
PLATELETS (10*3/UL) IN BLOOD AUTOMATED COUNT: 164 X10E9/L (ref 150–450)
POTASSIUM (MMOL/L) IN SER/PLAS: 3.9 MMOL/L (ref 3.5–5.3)
PROSTATE SPECIFIC AG (NG/ML) IN SER/PLAS: 0.96 NG/ML (ref 0–4)
PROTEIN TOTAL: 6.9 G/DL (ref 6.4–8.2)
PROTEIN, URINE: NEGATIVE MG/DL
RBC, URINE: 2 /HPF (ref 0–5)
SODIUM (MMOL/L) IN SER/PLAS: 139 MMOL/L (ref 136–145)
SPECIFIC GRAVITY, URINE: 1.02 (ref 1–1.03)
SQUAMOUS EPITHELIAL CELLS, URINE: <1 /HPF
TRIGLYCERIDE (MG/DL) IN SER/PLAS: 53 MG/DL (ref 0–149)
UREA NITROGEN (MG/DL) IN SER/PLAS: 21 MG/DL (ref 6–23)
UROBILINOGEN, URINE: <2 MG/DL (ref 0–1.9)
VLDL: 11 MG/DL (ref 0–40)
WBC, URINE: 2 /HPF (ref 0–5)

## 2023-09-08 PROCEDURE — 80053 COMPREHEN METABOLIC PANEL: CPT

## 2023-09-08 PROCEDURE — 80061 LIPID PANEL: CPT

## 2023-09-08 PROCEDURE — 36415 COLL VENOUS BLD VENIPUNCTURE: CPT

## 2023-09-08 PROCEDURE — 85025 COMPLETE CBC W/AUTO DIFF WBC: CPT

## 2023-09-09 LAB — URINE CULTURE: NORMAL

## 2023-09-29 ENCOUNTER — OFFICE VISIT (OUTPATIENT)
Dept: PRIMARY CARE | Facility: CLINIC | Age: 63
End: 2023-09-29
Payer: COMMERCIAL

## 2023-09-29 VITALS
TEMPERATURE: 97 F | HEIGHT: 73 IN | RESPIRATION RATE: 25 BRPM | WEIGHT: 293.2 LBS | DIASTOLIC BLOOD PRESSURE: 84 MMHG | BODY MASS INDEX: 38.86 KG/M2 | HEART RATE: 61 BPM | OXYGEN SATURATION: 96 % | SYSTOLIC BLOOD PRESSURE: 136 MMHG

## 2023-09-29 DIAGNOSIS — K21.9 GASTROESOPHAGEAL REFLUX DISEASE WITHOUT ESOPHAGITIS: ICD-10-CM

## 2023-09-29 DIAGNOSIS — M65.4 DE QUERVAIN'S TENOSYNOVITIS, LEFT: ICD-10-CM

## 2023-09-29 DIAGNOSIS — E66.01 CLASS 2 SEVERE OBESITY DUE TO EXCESS CALORIES WITH SERIOUS COMORBIDITY AND BODY MASS INDEX (BMI) OF 38.0 TO 38.9 IN ADULT (MULTI): ICD-10-CM

## 2023-09-29 DIAGNOSIS — I10 ESSENTIAL HYPERTENSION: ICD-10-CM

## 2023-09-29 DIAGNOSIS — B35.1 ONYCHOMYCOSIS OF TOENAIL: ICD-10-CM

## 2023-09-29 DIAGNOSIS — M19.91 PRIMARY LOCALIZED OSTEOARTHROSIS OF MULTIPLE SITES: ICD-10-CM

## 2023-09-29 DIAGNOSIS — Z00.00 HEALTHCARE MAINTENANCE: Primary | ICD-10-CM

## 2023-09-29 DIAGNOSIS — F51.04 CHRONIC INSOMNIA: ICD-10-CM

## 2023-09-29 DIAGNOSIS — G89.29 CHRONIC BILATERAL LOW BACK PAIN WITHOUT SCIATICA: ICD-10-CM

## 2023-09-29 DIAGNOSIS — M54.50 CHRONIC BILATERAL LOW BACK PAIN WITHOUT SCIATICA: ICD-10-CM

## 2023-09-29 PROCEDURE — 1036F TOBACCO NON-USER: CPT | Performed by: FAMILY MEDICINE

## 2023-09-29 PROCEDURE — 3075F SYST BP GE 130 - 139MM HG: CPT | Performed by: FAMILY MEDICINE

## 2023-09-29 PROCEDURE — 3008F BODY MASS INDEX DOCD: CPT | Performed by: FAMILY MEDICINE

## 2023-09-29 PROCEDURE — 99396 PREV VISIT EST AGE 40-64: CPT | Performed by: FAMILY MEDICINE

## 2023-09-29 PROCEDURE — 3079F DIAST BP 80-89 MM HG: CPT | Performed by: FAMILY MEDICINE

## 2023-09-29 RX ORDER — CICLOPIROX 80 MG/ML
SOLUTION TOPICAL NIGHTLY
Qty: 6.6 ML | Refills: 11 | Status: SHIPPED | OUTPATIENT
Start: 2023-09-29 | End: 2024-03-07 | Stop reason: ALTCHOICE

## 2023-09-29 RX ORDER — ZOLPIDEM TARTRATE 10 MG/1
10 TABLET ORAL NIGHTLY PRN
Qty: 30 TABLET | Refills: 2 | Status: SHIPPED | OUTPATIENT
Start: 2023-09-29 | End: 2023-12-29 | Stop reason: SDUPTHER

## 2023-09-29 RX ORDER — HYDROCODONE BITARTRATE AND ACETAMINOPHEN 5; 325 MG/1; MG/1
1 TABLET ORAL EVERY 6 HOURS PRN
Qty: 100 TABLET | Refills: 0 | Status: SHIPPED | OUTPATIENT
Start: 2023-09-29 | End: 2023-10-02 | Stop reason: RX

## 2023-09-29 ASSESSMENT — LIFESTYLE VARIABLES: TOTAL SCORE: 0

## 2023-09-29 NOTE — PATIENT INSTRUCTIONS
BMI was above normal measurement. Current weight: 133 kg (293 lb 3.2 oz)  Weight change since last visit (-) denotes wt loss -8.8 lbs   Weight loss needed to achieve BMI 25: 104.1 Lbs  Weight loss needed to achieve BMI 30: 66.3 Lbs  Advised to Increase physical activity.

## 2023-09-29 NOTE — ASSESSMENT & PLAN NOTE
Natural history and expected course discussed. Questions answered.  Rest, ice, compression, and elevation (RICE) therapy.  Reduction in offending activity discussed.  We will also him him start wearing a thumb spica.

## 2023-09-29 NOTE — ASSESSMENT & PLAN NOTE
Stable, continue current medications and management.  Natural history and expected course discussed. Questions answered.  Educational material distributed.  Proper lifting, bending technique discussed.  Stretching exercises discussed.  Regular aerobic and trunk strengthening exercises discussed.  Ice to affected area as needed for local pain relief.  Heat to affected area as needed for local pain relief.

## 2023-09-29 NOTE — PROGRESS NOTES
Chief Complaint   Patient presents with    Annual Exam    Follow-up     Hypertension, Chronic Back Pain, Insomnia and labs    Wrist Pain     Terry Lee is a 63 y.o. male who presents today for annual physical exam and follow up on hypertension and other chronic medical conditions. He is exercising and is adherent to a low-salt diet. Blood pressure is well controlled at home. Cardiac symptoms: none. Patient denies chest pain, dyspnea, exertional chest pressure/discomfort, near-syncope, orthopnea, palpitations, paroxysmal nocturnal dyspnea, and syncope.   Use of agents associated with hypertension: none. History of target organ damage: none.    He is also here to follow-up on her chronic low back pain. Symptoms have been unchanged. He has failed conservative treatment in the past including physical therapy, and NSAIDS. He has been taking the medication as prescribed with no evidence of abuse.    He is following up complains of insomnia. Onset was several years ago. Patient describes symptoms as frequent night time awakening and difficulty falling asleep. Patient has found minimal relief with prescription sleep aid, Ambien. Associated symptoms include: fatigue. Patient denies daytime somnolence, leg cramps, and stress. Symptoms have been well-controlled.    Patient complains of left wrist pain.  The pain is moderate, worsens with movement, and some relief by rest.  There is not associated numbness, tingling in the left hand. There is not a history of injury.    He also complains of discoloration of his toenails.  He has thickening and discoloration and discomfort of the toenails.  He has been using over-the-counter medication with no improvement.    Patient Active Problem List   Diagnosis    Acute non-recurrent maxillary sinusitis    Benign prostatic hyperplasia (BPH) with urinary urge incontinence    Cholecystitis    Chronic bilateral low back pain without sciatica    Chronic insomnia    Cough    Essential  hypertension    Generalized arthritis    GERD (gastroesophageal reflux disease)    Infected sebaceous cyst    Knee pain    Laceration of right great toe without foreign body present or damage to nail    Left flank pain    Left nephrolithiasis    Mass of kidney    Leg edema, left    Other chest pain    Penile cyst    Piriformis syndrome of both sides    Positive colorectal cancer screening using Cologuard test    Primary localized osteoarthrosis of multiple sites    Knee osteoarthritis    Prostatism    Renal colic on left side    Shoulder contusion    Shoulder pain    Sinus congestion    SOB (shortness of breath) on exertion    Tendinitis of right foot    Urinary hesitancy    Varicose veins of left lower extremity with edema    Class 2 severe obesity due to excess calories with serious comorbidity and body mass index (BMI) of 38.0 to 38.9 in adult (CMS/[facility])    Acute bronchitis    Dyslipidemia    Primary osteoarthritis of both knees    Sleep apnea    Healthcare maintenance    De Quervain's tenosynovitis, left    Onychomycosis of toenail           Past Medical History:   Diagnosis Date    Benign prostatic hyperplasia with lower urinary tract symptoms     Benign localized hyperplasia of prostate with urinary obstruction and lower urinary tract symptoms    Intra-abdominal abscess (CMS/[facility]) 02/15/2023    Personal history of other diseases of the circulatory system     History of hypertension    Personal history of other endocrine, nutritional and metabolic disease     History of vitamin D deficiency    Personal history of other specified conditions     History of gross hematuria        Current Outpatient Medications   Medication Sig Dispense Refill    acetaminophen (Tylenol) 325 mg tablet Take 1 tablet (325 mg) by mouth every 6 hours if needed.      cyanocobalamin, vitamin B-12, (VITAMIN B-12 ORAL)       cyanocobalamin, vitamin B-12, 1,000 mcg tablet, sublingual Place under the tongue.      famotidine (Pepcid) 20 mg  tablet Take 1 tablet (20 mg) by mouth once daily. 90 tablet 2    finasteride (Proscar) 5 mg tablet Take 1 tablet (5 mg) by mouth once daily.      multivit with minerals/lutein (MULTIVITAMIN 50 PLUS ORAL) Take 1 tablet by mouth once daily.      multivitamin tablet Multi Vitamin Daily TABS   Refills: 0       Active      naloxone (Narcan) 4 mg/0.1 mL nasal spray Use as directed      tadalafil (Cialis) 2.5 mg tablet Take 1 tablet 1 hour prior to activity      tamsulosin (Flomax) 0.4 mg 24 hr capsule TAKE 1 CAPSULE BY MOUTH EVERY DAY 90 capsule 1    valsartan-hydrochlorothiazide (Diovan-HCT) 160-12.5 mg tablet Take 1 tablet by mouth once daily. 90 tablet 1    ciclopirox (Penlac) 8 % solution Apply topically once daily at bedtime. 6.6 mL 11    HYDROcodone-acetaminophen (Norco) 5-325 mg tablet Take 1 tablet by mouth every 6 hours if needed for severe pain (7 - 10) (for 30 days). 100 tablet 0    zolpidem (Ambien) 10 mg tablet Take 1 tablet (10 mg) by mouth as needed at bedtime for sleep. 30 tablet 2     No current facility-administered medications for this visit.       No Known Allergies    Social History     Tobacco Use    Smoking status: Never    Smokeless tobacco: Never   Substance Use Topics    Alcohol use: Never      Review of Systems - General ROS: negative for - fatigue, fever, malaise, night sweats, sleep disturbance or weight loss  ENT ROS: negative for - hearing change, nasal discharge, oral lesions, sinus pain, sore throat, tinnitus or vertigo  Allergy and Immunology ROS: negative for - hives, nasal congestion or seasonal allergies  Hematological and Lymphatic ROS: negative for - bruising, fatigue, night sweats or pallor  Endocrine ROS: negative for - hot flashes, malaise/lethargy, palpitations, polydipsia/polyuria, skin changes, temperature intolerance or unexpected weight changes  Respiratory ROS: negative for - cough, hemoptysis, pleuritic pain, shortness of breath or wheezing  Cardiovascular ROS: no chest  "pain or dyspnea on exertion  Gastrointestinal ROS: no abdominal pain, change in bowel habits, or black or bloody stools  Genito-Urinary ROS: no dysuria, trouble voiding, or hematuria  Neurological ROS: negative for - dizziness, gait disturbance, headaches, impaired coordination/balance, numbness/tingling, tremors or visual changes  Dermatological ROS: negative for - dry skin, lumps, pruritus or rash    Objective:  Blood pressure 136/84, pulse 61, temperature 36.1 °C (97 °F), resp. rate 25, height 1.854 m (6' 1\"), weight 133 kg (293 lb 3.2 oz), SpO2 96 %.    Physical Examination: General appearance - alert, well appearing, and in no distress  Mental status - alert, oriented to person, place, and time  Eyes - pupils equal and reactive, extraocular eye movements intact  Ears - bilateral TM's and external ear canals normal  Mouth - mucous membranes moist, pharynx normal without lesions  Neck The neck is supple and free of adenopathy or masses, the thyroid is normal without enlargement or nodules.  Lymphatics - no palpable lymphadenopathy, no hepatosplenomegaly  Chest - clear to auscultation, no wheezes, rales or rhonchi, symmetric air entry  Heart - normal rate, regular rhythm, normal S1, S2, no murmurs, rubs, clicks or gallops  Abdomen - soft, nontender, nondistended, no masses or organomegaly  Neurological - alert, oriented, normal speech, no focal findings or movement disorder noted  Extremities: peripheral pulses normal, no pedal edema, no clubbing or cyanosis, intact peripheral pulses.  Has thickening and brownish discoloration of multiple toenails.    [facility] Encounter on 09/08/2023   Component Date Value Ref Range Status    Pathology Report 09/08/2023    Final                    Value:                                                MRN: [id number]  Patient Name PHILLIP HUDSON  Accession #: [id number]  Date of Procedure:  9/8/2023       Date Reported: 9/11/2023  Date Received:  9/8/2023  Date of Birth / Sex " 1960 (Age: 62) / M  Race: WHITE  Submitting Physician: DEVON GRAY M.D.    Other External #          FINAL CYTOLOGICAL INTERPRETATION    A.  URINE CLEAN CATCH:  --NO MALIGNANT CELLS IDENTIFIED.      Slide(s) initially screened by a Cytotechnologist at [place] [address]      Electronically Signed Out By Bhupinder Ceron MD    By the signature on this report, the individual or group listed as making the  Final Interpretation/Diagnosis certifies that they have reviewed this case.  Slide(s) initially screened by a Cytotechnologist at [facility]  Diagnostic interpretation performed at [address]. [place] [address]       Clinical History                                Hematuria - (R31.9)   Source of Specimen  A: URINE CLEAN CATCH     Specimen Submitted as:  A:  URINE CLEAN CATCH        Pap non-gyn ThinPrep slide    Gross Description  A.  URINE CLEAN CATCH:  RECEIVED 40cc OF STRAW COLORED, CLEAR FLUID.                      [facility]  Department of Pathology  [address] [place] [address]             Assessment / Plan:  Problem List Items Addressed This Visit       Chronic bilateral low back pain without sciatica    Current Assessment & Plan     Stable, continue current medications and management.  Natural history and expected course discussed. Questions answered.  Educational material distributed.  Proper lifting, bending technique discussed.  Stretching exercises discussed.  Regular aerobic and trunk strengthening exercises discussed.  Ice to affected area as needed for local pain relief.  Heat to affected area as needed for local pain relief.           Relevant Medications    HYDROcodone-acetaminophen (Norco) 5-325 mg tablet    Other Relevant Orders    Follow Up In [facility] - PCP - Established    Chronic insomnia    Current Assessment  "& Plan     Well-controlled, continue current medications and management.         Relevant Medications    zolpidem (Ambien) 10 mg tablet    Other Relevant Orders    Follow Up In [facility] - PCP - Established    Class 2 severe obesity due to excess calories with serious comorbidity and body mass index (BMI) of 38.0 to 38.9 in adult (CMS/[facility])    Current Assessment & Plan     Continue decrease calorie diet and not more than 1500 calorie per day diet and low-fat diet.  Continue with regular exercise program.  We advised exercise at least 5 days a week for at least 45 minutes and also a minimum of 10,000 steps a day.  The detrimental effects of obesity on health were discussed.           De Quervain's tenosynovitis, left    Current Assessment & Plan     Natural history and expected course discussed. Questions answered.  Rest, ice, compression, and elevation (RICE) therapy.  Reduction in offending activity discussed.  We will also him him start wearing a thumb spica.           Essential hypertension    Current Assessment & Plan     Well-controlled, continue current medications and management.  Dietary sodium restriction.  Regular aerobic exercise program is recommended to help achieve and maintain normal body weight, fitness and improve lipid balance. .  Dietary changes: Increase soluble fiber  Plant sterols 2grams per day (e.g. Benecol)  Reduce saturated fat, \"trans\" monounsaturated fatty acids, and cholesterol           Relevant Orders    Follow Up In [facility] - PCP - Established    GERD (gastroesophageal reflux disease)    Current Assessment & Plan     Well-controlled, continue current medications and management.           Healthcare maintenance - Primary    Onychomycosis of toenail    Current Assessment & Plan     We will have him start applying Penlac solution to the affected nails.         Relevant Medications    ciclopirox (Penlac) 8 % solution    Primary localized osteoarthrosis of multiple " sites    Current Assessment & Plan     Stable, continue current medications and management.           Relevant Orders    Follow Up In [facility] - PCP - Established        Outpatient Encounter Medications as of 9/29/2023   Medication Sig Dispense Refill    acetaminophen (Tylenol) 325 mg tablet Take 1 tablet (325 mg) by mouth every 6 hours if needed.      cyanocobalamin, vitamin B-12, (VITAMIN B-12 ORAL)       cyanocobalamin, vitamin B-12, 1,000 mcg tablet, sublingual Place under the tongue.      famotidine (Pepcid) 20 mg tablet Take 1 tablet (20 mg) by mouth once daily. 90 tablet 2    finasteride (Proscar) 5 mg tablet Take 1 tablet (5 mg) by mouth once daily.      multivit with minerals/lutein (MULTIVITAMIN 50 PLUS ORAL) Take 1 tablet by mouth once daily.      multivitamin tablet Multi Vitamin Daily TABS   Refills: 0       Active      naloxone (Narcan) 4 mg/0.1 mL nasal spray Use as directed      tadalafil (Cialis) 2.5 mg tablet Take 1 tablet 1 hour prior to activity      tamsulosin (Flomax) 0.4 mg 24 hr capsule TAKE 1 CAPSULE BY MOUTH EVERY DAY 90 capsule 1    valsartan-hydrochlorothiazide (Diovan-HCT) 160-12.5 mg tablet Take 1 tablet by mouth once daily. 90 tablet 1    [DISCONTINUED] HYDROcodone-acetaminophen (Norco) 5-325 mg tablet Take 1 tablet by mouth every 6 hours if needed for severe pain (7 - 10) (for 30 days). 100 tablet 0    [DISCONTINUED] zolpidem (Ambien) 10 mg tablet Take 1 tablet (10 mg) by mouth as needed at bedtime for sleep. 30 tablet 2    ciclopirox (Penlac) 8 % solution Apply topically once daily at bedtime. 6.6 mL 11    HYDROcodone-acetaminophen (Norco) 5-325 mg tablet Take 1 tablet by mouth every 6 hours if needed for severe pain (7 - 10) (for 30 days). 100 tablet 0    zolpidem (Ambien) 10 mg tablet Take 1 tablet (10 mg) by mouth as needed at bedtime for sleep. 30 tablet 2    [DISCONTINUED] semaglutide (Ozempic) 0.25 mg or 0.5 mg(2 mg/1.5 mL) pen injector Inject 0.25 mg subcutaneous  weekly x 4 week then increase to 0.5 mg weekly (Patient not taking: Reported on 9/29/2023) 1 each 2     No facility-administered encounter medications on file as of 9/29/2023.      OARRS:  Griffin Sharma MD on 9/29/2023  8:43 AM  I have personally reviewed the OARRS report for [facility]. I have considered the risks of abuse, dependence, addiction and diversion    Is the patient prescribed a combination of a benzodiazepine and opioid?  No    Last Urine Drug Screen / ordered today: No  Recent Results (from the past 8760 hour(s))   POCT waived urine drug screen manually resulted    Collection Time: 06/29/23  8:35 AM   Result Value Ref Range    POC THC Negative Negative, Not applicable ng/mL    POC Cocaine Negative Negative, Not applicable ng/mL    POC Opiates Negative Negative, Not applicable ng/mL    POC Amphetamine Negative Negative, Not applicable ng/mL    POC Phencyclidine (PCP) Negative Negative, Not applicable ng/mL    POC Barbiturates Negative Negative, Not applicable ng/mL    POC Benzodiazepines Negative Negative, Not applicable ng/mL    POC Methamphetamine Negative Negative, Not applicable ng/mL    POC METHADONE MANUALLY ENTERED Negative Negative, Not applicable ng/mL    POC Ticyclic Antidepressants (TCA) Negative Negative, Not applicable ng/mL    POC Oxycodone Negative Negative, Not applicable ng/mL    POC MDMA URINE Negative Negative, Not applicable ng/mL    POC Morphine Urine Negative Negative, Not applicable ng/mL    POC Burprenorphine Urine Negative Negative, Not applicable ng/mL   Zolpidem Confirmation, Urine    Collection Time: 06/29/23  8:34 AM   Result Value Ref Range    Zolpidem 46 (A) Cutoff <25 ng/mL    Zolpidem Metabolite (ZCA) >1000 (A) Cutoff <25 ng/mL     Controlled Substance Agreement:  Date of the Last Agreement: 3/29/2023  Reviewed Controlled Substance Agreement including but not limited to the benefits, risks, and alternatives to treatment with a Controlled Substance  medication(s).    Opioids:  What is the patient's goal of therapy? Pain control and to be able to walk and perform activities of daily living without pain     Is this being achieved with current treatment? yes    I have calculated the patient's Morphine Dose Equivalent (MED):   I have considered referral to Pain Management and/or a specialist, and do not feel it is necessary at this time.    I feel that it is clinically indicated to continue this current medication regimen after consideration of alternative therapies, and other non-opioid treatment.    Opioid Risk Screening:  Family History of Substance Abuse  Alcohol: 0 (9/29/2023  8:00 AM)  Illegal Drugs: 0 (9/29/2023  8:00 AM)  Prescription Drugs: 0 (9/29/2023  8:00 AM)    Personal History of Substance Abuse  Alcohol: 0 (9/29/2023  8:00 AM)  Illegal Drugs: 0 (9/29/2023  8:00 AM)  Prescription Drugs: 0 (9/29/2023  8:00 AM)    Patient Age (16-45)  Age (16-45): 0 (9/29/2023  8:00 AM)    History of Preadolescent Sexual Abuse  History of Preadolescent Sexual Abuse: .0 (9/29/2023  8:00 AM)    Psychological Disease  Attention Deficit Disorder, Obsessive Compulsive Disorder, Bipolar, Schizophrenia: 0 (9/29/2023  8:00 AM)  Depression: 0 (9/29/2023  8:00 AM)    Total Score  Total Score: 0 (9/29/2023  8:00 AM)    Total Score Risk Category  TOTAL SCORE CATEGORY: Low Risk (0-3) (9/29/2023  8:00 AM)    Pain Assessment:  Analgesia  What was your pain level on average during the past week?: 3  What was your pain level at its worst during the past week?: 5  What percentage of your pain has been relieved during the past week?: 50 %  Is the amount of pain relief you are now obtaining from your current pain relievers enough to make a real difference in your life?: Y  Query to Clinician: Is the patient's pain relief clinically significant?: Yes    Activities of Daily Living  Physical Functioning: Better  Family Relationships: Better  Social Relationships: Better  Mood: Better  Sleep  Patterns: Better  Overall Functioning: Better    Adverse Events  Is patient experiencing any side effects from current pain relievers?: N  Patient's Overall Severity of Side Effects: None      Assessment  Is your overall impression that this patient is benefiting from opioid therapy?: Yes  Specific Analgesic Plan: Continue present regimen    and Sleep Aids:   What is the patient's goal of therapy? To be able to get uninterrupted sleep for 6-8 hours during the night without being unduly tired, dizzy or drowsy during the day    Is this being achieved with current treatment? yes    Activities of Daily Living:   Is your overall impression that this patient is benefiting (symptom reduction outweighs side effects) from sleep aid therapy? Yes     1. Physical Functioning: Better  2. Family Relationship: Better  3. Social Relationship: Better  4. Mood: Better  5. Sleep Patterns: Better  6. Overall Function: Better    Scribe Attestation  By signing my name below, IJairon Scribe   attest that this documentation has been prepared under the direction and in the presence of Griffin Sharma MD.

## 2023-10-02 DIAGNOSIS — M54.50 CHRONIC BILATERAL LOW BACK PAIN WITHOUT SCIATICA: Primary | ICD-10-CM

## 2023-10-02 DIAGNOSIS — G89.29 CHRONIC BILATERAL LOW BACK PAIN WITHOUT SCIATICA: Primary | ICD-10-CM

## 2023-10-02 RX ORDER — HYDROCODONE BITARTRATE AND ACETAMINOPHEN 5; 300 MG/1; MG/1
1 TABLET ORAL EVERY 6 HOURS PRN
Qty: 100 TABLET | Refills: 0 | Status: SHIPPED | OUTPATIENT
Start: 2023-10-02 | End: 2023-10-11 | Stop reason: SDUPTHER

## 2023-10-02 NOTE — TELEPHONE ENCOUNTER
[facility]- [facility]codone- acetamin 5/325mg out of stock, per pharmacist the hydrocodone- acetamin 5-300mg can be substituted. Can you advise?  RX pended if approved by PCP.

## 2023-10-11 DIAGNOSIS — G89.29 CHRONIC BILATERAL LOW BACK PAIN WITHOUT SCIATICA: ICD-10-CM

## 2023-10-11 DIAGNOSIS — M54.50 CHRONIC BILATERAL LOW BACK PAIN WITHOUT SCIATICA: ICD-10-CM

## 2023-10-11 RX ORDER — HYDROCODONE BITARTRATE AND ACETAMINOPHEN 5; 300 MG/1; MG/1
1 TABLET ORAL EVERY 6 HOURS PRN
Qty: 100 TABLET | Refills: 0 | Status: SHIPPED | OUTPATIENT
Start: 2023-10-11 | End: 2023-10-16 | Stop reason: DRUGHIGH

## 2023-10-11 NOTE — TELEPHONE ENCOUNTER
Patient is requesting Rx be sent to [facility] as [facility] is out of Hydrocodone.    Rx cancelled and pended to [facility]

## 2023-10-12 ENCOUNTER — OFFICE VISIT (OUTPATIENT)
Dept: SURGERY | Facility: CLINIC | Age: 63
End: 2023-10-12
Payer: COMMERCIAL

## 2023-10-12 VITALS — BODY MASS INDEX: 37.77 KG/M2 | WEIGHT: 285 LBS | HEIGHT: 73 IN

## 2023-10-12 DIAGNOSIS — K62.5 RECTAL BLEEDING: Primary | ICD-10-CM

## 2023-10-12 PROCEDURE — 99212 OFFICE O/P EST SF 10 MIN: CPT | Performed by: SURGERY

## 2023-10-12 PROCEDURE — 1036F TOBACCO NON-USER: CPT | Performed by: SURGERY

## 2023-10-12 PROCEDURE — 3008F BODY MASS INDEX DOCD: CPT | Performed by: SURGERY

## 2023-10-12 NOTE — PROGRESS NOTES
Subjective   Patient ID: Terry Lee is a 63 y.o. male who presents for New Patient Visit.  HPI  63-year-old male referred for rectal bleeding and anorectal symptoms that have decreased for the past 2 weeks.  No family history of colorectal cancer last colonoscopy 8 to 9 years ago patient had some anal bleeding some leakage  Review of Systems   All other systems reviewed and are negative.      Objective   Physical Exam  Genitourinary:     Comments: Examined with chaperone-no fissures or fistula mild internal hemorrhoids 1 prominent      Physical Exam  Constitutional:       Appearance: Normal appearance.   HENT:      Head: Normocephalic and atraumatic.      Mouth/Throat:      Pharynx: Oropharynx is clear.   Eyes:      Pupils: Pupils are equal, round, and reactive to light.   Cardiovascular:      Rate and Rhythm: Normal rate and regular rhythm.   Pulmonary:      Effort: Pulmonary effort is normal.      Breath sounds: Normal breath sounds.   Abdominal:      General: Abdomen is flat. Bowel sounds are normal.      Palpations: Abdomen is soft.   Musculoskeletal:         General: Normal range of motion.      Cervical back: Normal range of motion.   Skin:     General: Skin is warm.   Neurological:      General: No focal deficit present.      Mental Status: She is alert. Mental status is at baseline.   Psychiatric:         Mood and Affect: Mood normal.    Assessment/Plan     Hemorrhoidal bleeding most likely had recent colonoscopy by GI patient to see back if he has any issues

## 2023-10-16 ENCOUNTER — TELEPHONE (OUTPATIENT)
Dept: PRIMARY CARE | Facility: CLINIC | Age: 63
End: 2023-10-16

## 2023-10-16 RX ORDER — HYDROCODONE BITARTRATE AND ACETAMINOPHEN 5; 325 MG/1; MG/1
1 TABLET ORAL EVERY 6 HOURS PRN
Qty: 100 TABLET | Refills: 0 | Status: SHIPPED | OUTPATIENT
Start: 2023-10-16 | End: 2023-12-29 | Stop reason: SDUPTHER

## 2023-10-16 NOTE — TELEPHONE ENCOUNTER
Patient aware he will need to contact local pharmacies to find one that has it in stock as Dr. Shamra can't keep switching pharmacies.

## 2023-10-16 NOTE — TELEPHONE ENCOUNTER
Patient called in stating he has been able to find [facility]codone at [facility] [place]. He stated this pharmacy has this prescription in foil packs instead of a bottle. He stated each foil pack has 5 pills and stated he will be needing a prescription for 100 pills for this  Please Advise

## 2023-10-16 NOTE — TELEPHONE ENCOUNTER
Patient called back [facility] is also out of vicodin and doesnt know what to do. Doesn't know if he should try somewhere else or if he should try a different med for the time being.

## 2023-10-23 ENCOUNTER — CLINICAL SUPPORT (OUTPATIENT)
Dept: ORTHOPEDIC SURGERY | Facility: CLINIC | Age: 63
End: 2023-10-23
Payer: COMMERCIAL

## 2023-10-23 ENCOUNTER — OFFICE VISIT (OUTPATIENT)
Dept: ORTHOPEDIC SURGERY | Facility: CLINIC | Age: 63
End: 2023-10-23
Payer: COMMERCIAL

## 2023-10-23 DIAGNOSIS — M65.4 DE QUERVAIN'S TENOSYNOVITIS, LEFT: Primary | ICD-10-CM

## 2023-10-23 DIAGNOSIS — M79.645 THUMB PAIN, LEFT: ICD-10-CM

## 2023-10-23 PROCEDURE — 99214 OFFICE O/P EST MOD 30 MIN: CPT | Performed by: ORTHOPAEDIC SURGERY

## 2023-10-23 PROCEDURE — 20550 NJX 1 TENDON SHEATH/LIGAMENT: CPT | Performed by: ORTHOPAEDIC SURGERY

## 2023-10-23 PROCEDURE — 3008F BODY MASS INDEX DOCD: CPT | Performed by: ORTHOPAEDIC SURGERY

## 2023-10-23 PROCEDURE — 73140 X-RAY EXAM OF FINGER(S): CPT | Mod: LEFT SIDE | Performed by: ORTHOPAEDIC SURGERY

## 2023-10-23 PROCEDURE — 1036F TOBACCO NON-USER: CPT | Performed by: ORTHOPAEDIC SURGERY

## 2023-10-23 RX ORDER — LIDOCAINE HYDROCHLORIDE 10 MG/ML
1 INJECTION INFILTRATION; PERINEURAL ONCE
Status: SHIPPED | OUTPATIENT
Start: 2023-10-23

## 2023-10-23 NOTE — PROGRESS NOTES
History present illness: Patient presents today for evaluation of pain to the left radial wrist.  He denies discrete injury.  This has been ongoing for a couple of months.  He is right-hand dominant and otherwise healthy.  He feels it is work-related as he has been sawing cannabis for boats and that seems to have precipitated the symptoms.      Past medical history: The patient's past medical history, family history, social history, and review of systems were documented on the patient medical intake.  The updated data was reviewed in the electronic medical record.  History is negative except otherwise stated in history of present illness.        Physical examination:  General: Alert and oriented to person, place, and time.  No acute distress and breathing comfortably: Pleasant and cooperative with examination.  HEENT: Head is normocephalic and atraumatic.  Neck: Supple, no visible swelling.  Cardiovascular: No palpable tachycardia  Lungs: No audible wheezing or labored breathing  Abdomen: Nondistended.  Extremities: Evaluation of the left upper extremity finds the patient had palpable radial artery at the wrist with brisk capillary refill to all digits.  Patient has intact sensation to axillary radial median and ulnar nerves.  There are no open wounds.  There are no signs of infection.  There is no evidence of lymphedema or lymphatic streaking.  The patient has supple compartments to left arm forearm and hand.  Tenderness and swelling to left wrist first dorsal compartment.  Positive Finkelstein's.      Radiology:      Assessment: Left wrist de Quervain's disease.  Possible component of left thumb CMC arthritis.      Plan: We explained the arthritic changes notable on x-rays.  He examines more like de Quervain's however.  We will going to start by way of formal therapy protocol for Decore veins and steroid injection to the first dorsal compartment.  I will see him back in 8 weeks.  No x-rays necessary upon  return.        Procedure:  Left DeQuervain´s Injection: It was explained to the patient that the risks of a steroid injection include but are not limited to infection, local skin irritation, skin atrophy, calcification, continued pain and discomfort, elevation of blood sugar, burning, failure to relieve pain, and possible late infection. The patient verbalized good insight and verbalized consent for the injection. It was further explained that post injection discomfort can be alleviated with additional medications, ice, elevation, and rest over the first 24 hours, and these modalities are recommended.     Using aseptic technique, a solution containing 10 mg of Kenalog and 1 mL of 1% lidocaine without epinephrine was injected into the left wrist first dorsal compartment tendon sheath. This was done by palpating the radial styloid, and with active participation from the patient we were able to localize the tendons of the first dorsal compartment. The skin was prepped and the needle slowly advanced until the needle tip was in the tendon sheath. The solution was then slowly administered and the patient tolerated it well. A band-aid was placed. It should be noted that ethyl chloride spray was used to make the injection delivery more comfortable for the patient.

## 2023-12-11 ENCOUNTER — OFFICE VISIT (OUTPATIENT)
Dept: ORTHOPEDIC SURGERY | Facility: CLINIC | Age: 63
End: 2023-12-11
Payer: COMMERCIAL

## 2023-12-11 DIAGNOSIS — M65.4 DE QUERVAIN'S TENOSYNOVITIS, LEFT: ICD-10-CM

## 2023-12-11 PROCEDURE — 99213 OFFICE O/P EST LOW 20 MIN: CPT | Performed by: ORTHOPAEDIC SURGERY

## 2023-12-11 PROCEDURE — 3008F BODY MASS INDEX DOCD: CPT | Performed by: ORTHOPAEDIC SURGERY

## 2023-12-11 PROCEDURE — 1036F TOBACCO NON-USER: CPT | Performed by: ORTHOPAEDIC SURGERY

## 2023-12-11 NOTE — PROGRESS NOTES
History present illness: Patient presents today for evaluation of left wrist de Quervain's disease.  Steroid injection brought about some relief.  He did not do formal therapy.        Physical examination:  General: Alert and oriented to person, place, and time.  No acute distress and breathing comfortably: Pleasant and cooperative with examination.  Extremities: Evaluation of the left upper extremity finds the patient had palpable radial artery at the wrist with brisk capillary refill to all digits.  Patient has intact sensation to axillary radial median and ulnar nerves.  There are no open wounds.  There are no signs of infection.  There is no evidence of lymphedema or lymphatic streaking.  The patient has supple compartments to left arm forearm and hand.  Focal tenderness to left wrist at first dorsal compartment      Diagnostic studies:      Assessment: Left wrist de Quervain's disease      Plan: Treatment options were discussed.  Recommendations were made for formal therapy for Decore veins as was discussed last visit.  Plan for follow-up with me in the office in 8 weeks for repeat clinical evaluation.  No x-rays necessary upon return.      Procedure:        Procedure:

## 2023-12-19 DIAGNOSIS — N40.1 BENIGN PROSTATIC HYPERPLASIA (BPH) WITH URINARY URGE INCONTINENCE: ICD-10-CM

## 2023-12-19 DIAGNOSIS — N39.41 BENIGN PROSTATIC HYPERPLASIA (BPH) WITH URINARY URGE INCONTINENCE: ICD-10-CM

## 2023-12-19 DIAGNOSIS — R39.11 URINARY HESITANCY: ICD-10-CM

## 2023-12-19 RX ORDER — FINASTERIDE 5 MG/1
5 TABLET, FILM COATED ORAL DAILY
Qty: 90 TABLET | Refills: 1 | Status: SHIPPED | OUTPATIENT
Start: 2023-12-19 | End: 2024-04-26

## 2023-12-19 NOTE — TELEPHONE ENCOUNTER
Rx Refill Request Telephone Encounter    Name:  Malvinvince COSTA Lee  :  [id number]  Medication Name:  finasteride (Proscar) 5 mg   Specific Pharmacy location:  [facility]  Date of last appointment:    Date of next appointment:    Best number to reach patient:  [phone]

## 2023-12-29 ENCOUNTER — EVALUATION (OUTPATIENT)
Dept: OCCUPATIONAL THERAPY | Facility: CLINIC | Age: 63
End: 2023-12-29
Payer: COMMERCIAL

## 2023-12-29 ENCOUNTER — OFFICE VISIT (OUTPATIENT)
Dept: PRIMARY CARE | Facility: CLINIC | Age: 63
End: 2023-12-29
Payer: COMMERCIAL

## 2023-12-29 VITALS
SYSTOLIC BLOOD PRESSURE: 124 MMHG | TEMPERATURE: 97.3 F | DIASTOLIC BLOOD PRESSURE: 86 MMHG | RESPIRATION RATE: 16 BRPM | WEIGHT: 290.6 LBS | BODY MASS INDEX: 38.51 KG/M2 | OXYGEN SATURATION: 97 % | HEIGHT: 73 IN | HEART RATE: 68 BPM

## 2023-12-29 DIAGNOSIS — K21.9 GASTROESOPHAGEAL REFLUX DISEASE WITHOUT ESOPHAGITIS: ICD-10-CM

## 2023-12-29 DIAGNOSIS — M19.91 PRIMARY LOCALIZED OSTEOARTHROSIS OF MULTIPLE SITES: ICD-10-CM

## 2023-12-29 DIAGNOSIS — M54.50 CHRONIC BILATERAL LOW BACK PAIN WITHOUT SCIATICA: ICD-10-CM

## 2023-12-29 DIAGNOSIS — F51.04 CHRONIC INSOMNIA: ICD-10-CM

## 2023-12-29 DIAGNOSIS — N39.41 BENIGN PROSTATIC HYPERPLASIA (BPH) WITH URINARY URGE INCONTINENCE: ICD-10-CM

## 2023-12-29 DIAGNOSIS — G89.29 CHRONIC BILATERAL LOW BACK PAIN WITHOUT SCIATICA: ICD-10-CM

## 2023-12-29 DIAGNOSIS — E66.01 CLASS 2 SEVERE OBESITY DUE TO EXCESS CALORIES WITH SERIOUS COMORBIDITY AND BODY MASS INDEX (BMI) OF 38.0 TO 38.9 IN ADULT (MULTI): ICD-10-CM

## 2023-12-29 DIAGNOSIS — M65.4 DE QUERVAIN'S TENOSYNOVITIS, LEFT: Primary | ICD-10-CM

## 2023-12-29 DIAGNOSIS — N40.1 BENIGN PROSTATIC HYPERPLASIA (BPH) WITH URINARY URGE INCONTINENCE: ICD-10-CM

## 2023-12-29 DIAGNOSIS — I10 ESSENTIAL HYPERTENSION: Primary | ICD-10-CM

## 2023-12-29 PROCEDURE — 3008F BODY MASS INDEX DOCD: CPT | Performed by: FAMILY MEDICINE

## 2023-12-29 PROCEDURE — 97140 MANUAL THERAPY 1/> REGIONS: CPT | Mod: GO | Performed by: OCCUPATIONAL THERAPIST

## 2023-12-29 PROCEDURE — 97165 OT EVAL LOW COMPLEX 30 MIN: CPT | Mod: GO | Performed by: OCCUPATIONAL THERAPIST

## 2023-12-29 PROCEDURE — 3074F SYST BP LT 130 MM HG: CPT | Performed by: FAMILY MEDICINE

## 2023-12-29 PROCEDURE — 1036F TOBACCO NON-USER: CPT | Performed by: FAMILY MEDICINE

## 2023-12-29 PROCEDURE — 99213 OFFICE O/P EST LOW 20 MIN: CPT | Performed by: FAMILY MEDICINE

## 2023-12-29 PROCEDURE — 3079F DIAST BP 80-89 MM HG: CPT | Performed by: FAMILY MEDICINE

## 2023-12-29 RX ORDER — ZOLPIDEM TARTRATE 10 MG/1
10 TABLET ORAL NIGHTLY PRN
Qty: 30 TABLET | Refills: 2 | Status: SHIPPED | OUTPATIENT
Start: 2023-12-29 | End: 2024-03-27 | Stop reason: SDUPTHER

## 2023-12-29 RX ORDER — TAMSULOSIN HYDROCHLORIDE 0.4 MG/1
0.4 CAPSULE ORAL DAILY
Qty: 90 CAPSULE | Refills: 1 | Status: SHIPPED | OUTPATIENT
Start: 2023-12-29

## 2023-12-29 RX ORDER — HYDROCODONE BITARTRATE AND ACETAMINOPHEN 5; 325 MG/1; MG/1
1 TABLET ORAL EVERY 6 HOURS PRN
Qty: 100 TABLET | Refills: 0 | Status: SHIPPED | OUTPATIENT
Start: 2023-12-29 | End: 2024-03-27 | Stop reason: SDUPTHER

## 2023-12-29 RX ORDER — VALSARTAN AND HYDROCHLOROTHIAZIDE 160; 12.5 MG/1; MG/1
1 TABLET, FILM COATED ORAL DAILY
Qty: 90 TABLET | Refills: 1 | Status: SHIPPED | OUTPATIENT
Start: 2023-12-29

## 2023-12-29 ASSESSMENT — ENCOUNTER SYMPTOMS
ABDOMINAL PAIN: 0
WHEEZING: 0
HEMATURIA: 0
TINGLING: 0
TREMORS: 0
RHINORRHEA: 0
DYSURIA: 0
ARTHRALGIAS: 1
DIZZINESS: 0
BACK PAIN: 1
FREQUENCY: 0
SORE THROAT: 0
CONSTIPATION: 0
WEAKNESS: 0
COUGH: 0
DIARRHEA: 0
PALPITATIONS: 0
SHORTNESS OF BREATH: 0
HEADACHES: 0
NUMBNESS: 0
VOMITING: 0
CHILLS: 0
FEVER: 0
INSOMNIA: 1

## 2023-12-29 ASSESSMENT — PAIN DESCRIPTION - DESCRIPTORS: DESCRIPTORS: SHARP

## 2023-12-29 ASSESSMENT — PATIENT HEALTH QUESTIONNAIRE - PHQ9
2. FEELING DOWN, DEPRESSED OR HOPELESS: NOT AT ALL
SUM OF ALL RESPONSES TO PHQ9 QUESTIONS 1 AND 2: 0
1. LITTLE INTEREST OR PLEASURE IN DOING THINGS: NOT AT ALL

## 2023-12-29 ASSESSMENT — PAIN SCALES - GENERAL
PAINLEVEL: 0-NO PAIN
PAINLEVEL_OUTOF10: 8

## 2023-12-29 ASSESSMENT — LIFESTYLE VARIABLES: TOTAL SCORE: 0

## 2023-12-29 ASSESSMENT — ACTIVITIES OF DAILY LIVING (ADL): HOME_MANAGEMENT_TIME_ENTRY: 5

## 2023-12-29 ASSESSMENT — PAIN - FUNCTIONAL ASSESSMENT: PAIN_FUNCTIONAL_ASSESSMENT: 0-10

## 2023-12-29 NOTE — PROGRESS NOTES
Occupational Therapy    Occupational Therapy Evaluation    Name: Terry Lee  MRN: [id number]  : 1960   DATE: 23   Time Calculation  Start Time: 1000  Stop Time: 1040  Time Calculation (min): 40 min     Insurance Authorization Request: DELFINA  TRADITIONAL 30V PT OT COPAY 25 DED 0  COVERAGE 85 OOP 1600(0) 3200(0) NO AUTH REQ     Assessment:  Patient is a 63 y.o. male who is diagnosed with left De Quervain's and is treated conservatively.    Patient presented with pain in left radial side wrist. He resides home independently with spouse, is a self-employed boat canvas fabricator. Meaningful leisure activities are working on boats and watching TV. Educated patient on utilization of heat and modification to grasping/lifting pattern to promote proper tissue healing. Also issued patient  tool and applied K-tape for pain management. Patient will benefit from skilled OT for pain control and later progress to strengthening to support return to all ADL/ IADL, work, and leisure activities.     Plan:  Outpatient Plan  OT Plan: Modalities, dry needling, therapeutic exercise, therapeutic activity, manual therapy, neuromotor re-education  Frequency: 1x/wk  Duration: 6 weeks  Rehab Potential: Good  Plan of Care Agreement: Patient    Subjective   Current Problem:  Problem List Items Addressed This Visit          Musculoskeletal and Injuries    De Quervain's tenosynovitis, left - Primary    Relevant Orders    Follow Up In Occupational Therapy        DOI: No acute onset   Surgical Procedure: n/a  DOS: n/a  Hand Dominance: right   Affected Extremity: left    Mechanism of Injury: Started to have left wrist pain about 3 months ago. Received an injection 2 months ago and felt it did not help.     Precautions: None    Pain Assessment:  Location: 1st dorsal compartment   0/10 at rest, 8/10 at highest  Description: sharp, burning      Homeliving/Social Support: living home with spouse     Work: [employer]  "    Meaningful Activities: Watch on TV, work on boats     Previous Level of Function Per Patient/Caregiver Report: Independent with ADL, IADL, work and leisure activities    Chief complaint: Pain     Patient stated goals for therapy: \"Have no more pain for work.\"       Objective   UE Assessment  Observation: No edema presented in left wrist    Palpation: Mild tenderness upon palpation over 1st dorsal compartment     Range of Motion (in degrees)  Shoulder AROM: WNL     PROM: WNL     Elbow AROM: WNL      PROM: WNL     Wrist AROM: WNL       PROM: WNL      Digits AROM: WNL   PROM: WNL     Thumb AROM: WNL    PROM: WNL     Sensation: No paresthesia      Strength:   strength: R 70#  L 65#  Lateral pinch strength: R 18#  L 16# - reports pain   3 point pinch strength: R 14#   L 12# - reports pain    Rapid Exchange : N/A    Coordination: TBT    Special Test: Finkelstein Sign (+)    Treatment Performed: Issued patient  tool for soft tissue mob at home. Educated patient on utilization of heat and modified grasping/pinching/lifting pattern during daily activities to promote tissue healing. Applied K-tape to 1st dorsal compartment to promote radial side wrist stability and decrease pain.    Outcome Measures:  Quick Dash Score: at eval 34.09%    OP EDUCATION:  Education  Individual(s) Educated: Patient  Education Provided: Anatomy & Physiology, Diagnosis & Precautions, Symptom management, Risk and benefits of OT discussed with patient or other, POC discussed and agreed upon  Patient/Caregiver Demonstrated Understanding: yes  Plan of Care Discussed and Agreed Upon: yes  Patient Response to Education: Patient/Caregiver Verbalized Understanding of Information, Patient/Caregiver Performed Return Demonstration of Exercises/Activities, Patient/Caregiver Asked Appropriate Questions     Goals:  By discharge (6 weeks) Terry Lee  will achieve the following goals:     1. Patient to report pain 0/10 when performing " work tasks  2. Patient to demonstrate lateral pinch strength to increase 2# without reporting wrist pain.    3. Patient to demonstrate proper technique and competence with HEP   4. Patient to score disability rate less than 10% on Quick DASH

## 2023-12-29 NOTE — PROGRESS NOTES
"Subjective   Patient ID: Terry Lee is a 63 y.o. male who presents for Follow-up (Hypertension, Chronic Back Pain, and Chronic Insomnia).    Patient is here for follow-up on hypertension. Cardiac symptoms: none. He has no chest pain, dyspnea, exertional chest pressure/discomfort, near-syncope, orthopnea, palpitations, paroxysmal nocturnal dyspnea, and syncope. Use of agents associated with hypertension: none. History of target organ damage: none.    Back Pain  This is a chronic problem. The current episode started more than 1 year ago. The problem is unchanged. The pain is present in the lumbar spine. Pertinent negatives include no abdominal pain, chest pain, dysuria, fever, headaches, numbness, tingling or weakness.   Insomnia  This is a chronic problem. The current episode started more than 1 year ago. The problem occurs daily. The problem has been unchanged. Associated symptoms include arthralgias. Pertinent negatives include no abdominal pain, chest pain, chills, congestion, coughing, fever, headaches, numbness, sore throat, vomiting or weakness. Nothing aggravates the symptoms. Treatments tried: Ambien with relief.      Review of Systems   Constitutional:  Negative for chills and fever.   HENT:  Negative for congestion, ear pain, nosebleeds, rhinorrhea and sore throat.    Respiratory:  Negative for cough, shortness of breath and wheezing.    Cardiovascular:  Negative for chest pain, palpitations and leg swelling.   Gastrointestinal:  Negative for abdominal pain, constipation, diarrhea and vomiting.   Genitourinary:  Negative for dysuria, frequency and hematuria.   Musculoskeletal:  Positive for arthralgias and back pain.   Neurological:  Negative for dizziness, tingling, tremors, weakness, numbness and headaches.   Psychiatric/Behavioral:  The patient has insomnia.        Objective   /86   Pulse 68   Temp 36.3 °C (97.3 °F)   Resp 16   Ht 1.854 m (6' 1\")   Wt 132 kg (290 lb 9.6 oz)   SpO2 97%  "  BMI 38.34 kg/m²     Physical Exam  Constitutional:       General: He is not in acute distress.     Appearance: Normal appearance.   HENT:      Head: Normocephalic and atraumatic.      Mouth/Throat:      Mouth: Mucous membranes are moist.      Pharynx: Oropharynx is clear. No oropharyngeal exudate or posterior oropharyngeal erythema.   Eyes:      General: No scleral icterus.     Extraocular Movements: Extraocular movements intact.      Pupils: Pupils are equal, round, and reactive to light.   Cardiovascular:      Rate and Rhythm: Normal rate and regular rhythm.      Pulses: Normal pulses.      Heart sounds: No murmur heard.     No friction rub. No gallop.   Pulmonary:      Effort: Pulmonary effort is normal.      Breath sounds: No wheezing, rhonchi or rales.   Skin:     General: Skin is warm.      Coloration: Skin is not jaundiced or pale.   Neurological:      General: No focal deficit present.      Mental Status: He is alert and oriented to person, place, and time.         Assessment/Plan   Problem List Items Addressed This Visit       Benign prostatic hyperplasia (BPH) with urinary urge incontinence     Stable, continue current medications and management.         Relevant Medications    tamsulosin (Flomax) 0.4 mg 24 hr capsule    Chronic bilateral low back pain without sciatica     Stable, continue current medications and management.  Natural history and expected course discussed. Questions answered.  Educational material distributed.  Proper lifting, bending technique discussed.  Stretching exercises discussed.  Regular aerobic and trunk strengthening exercises discussed.  Ice to affected area as needed for local pain relief.  Heat to affected area as needed for local pain relief.         Relevant Medications    HYDROcodone-acetaminophen (Norco) 5-325 mg tablet    Other Relevant Orders    Follow Up In [facility] - PCP - Established    Chronic insomnia     Stable, continue current medications and  "management.         Relevant Medications    zolpidem (Ambien) 10 mg tablet    Other Relevant Orders    Follow Up In [facility] - PCP - Established    Class 2 severe obesity due to excess calories with serious comorbidity and body mass index (BMI) of 38.0 to 38.9 in adult (CMS/[facility])     Continue decrease calorie diet and not more than 1500 calorie per day diet and low-fat diet.  Continue with regular exercise program.  We advised exercise at least 5 days a week for at least 45 minutes and also a minimum of 10,000 steps a day.  The detrimental effects of obesity on health were discussed.         Essential hypertension - Primary     Well-controlled, continue current medications and management.  Dietary sodium restriction.  Regular aerobic exercise program is recommended to help achieve and maintain normal body weight, fitness and improve lipid balance. .  Dietary changes: Increase soluble fiber  Plant sterols 2grams per day (e.g. Benecol)  Reduce saturated fat, \"trans\" monounsaturated fatty acids, and cholesterol         Relevant Medications    valsartan-hydrochlorothiazide (Diovan-HCT) 160-12.5 mg tablet    Other Relevant Orders    Follow Up In [facility] - PCP - Established    GERD (gastroesophageal reflux disease)     Stable, continue current medications and management.         Primary localized osteoarthrosis of multiple sites     Stable, continue current medications and management.         Relevant Orders    Follow Up In [facility] - PCP - Established       OARRS:  Griffin Sharma MD on 12/29/2023  9:10 AM  I have personally reviewed the OARRS report for Terry Lee. I have considered the risks of abuse, dependence, addiction and diversion    Is the patient prescribed a combination of a benzodiazepine and opioid?  No    Last Urine Drug Screen / ordered today: No  Recent Results (from the past 8760 hour(s))   POCT waived urine drug screen manually resulted    Collection Time: " 06/29/23  8:35 AM   Result Value Ref Range    POC THC Negative Negative, Not applicable ng/mL    POC Cocaine Negative Negative, Not applicable ng/mL    POC Opiates Negative Negative, Not applicable ng/mL    POC Amphetamine Negative Negative, Not applicable ng/mL    POC Phencyclidine (PCP) Negative Negative, Not applicable ng/mL    POC Barbiturates Negative Negative, Not applicable ng/mL    POC Benzodiazepines Negative Negative, Not applicable ng/mL    POC Methamphetamine Negative Negative, Not applicable ng/mL    POC METHADONE MANUALLY ENTERED Negative Negative, Not applicable ng/mL    POC Ticyclic Antidepressants (TCA) Negative Negative, Not applicable ng/mL    POC Oxycodone Negative Negative, Not applicable ng/mL    POC MDMA URINE Negative Negative, Not applicable ng/mL    POC Morphine Urine Negative Negative, Not applicable ng/mL    POC Burprenorphine Urine Negative Negative, Not applicable ng/mL   Zolpidem Confirmation, Urine    Collection Time: 06/29/23  8:34 AM   Result Value Ref Range    Zolpidem 46 (A) Cutoff <25 ng/mL    Zolpidem Metabolite (ZCA) >1000 (A) Cutoff <25 ng/mL     Controlled Substance Agreement:  Date of the Last Agreement: 3/29/2023  Reviewed Controlled Substance Agreement including but not limited to the benefits, risks, and alternatives to treatment with a Controlled Substance medication(s).    Opioids:  What is the patient's goal of therapy? Pain control and to be able to walk and perform activities of daily living without pain     Is this being achieved with current treatment? yes    I have calculated the patient's Morphine Dose Equivalent (MED):   I have considered referral to Pain Management and/or a specialist, and do not feel it is necessary at this time.    I feel that it is clinically indicated to continue this current medication regimen after consideration of alternative therapies, and other non-opioid treatment.    Opioid Risk Screening:  Family History of Substance Abuse  Alcohol:  0 (12/29/2023  9:00 AM)  Illegal Drugs: 0 (12/29/2023  9:00 AM)  Prescription Drugs: 0 (12/29/2023  9:00 AM)    Personal History of Substance Abuse  Alcohol: 0 (12/29/2023  9:00 AM)  Illegal Drugs: 0 (12/29/2023  9:00 AM)  Prescription Drugs: 0 (12/29/2023  9:00 AM)    Patient Age (16-45)  Age (16-45): 0 (12/29/2023  9:00 AM)    History of Preadolescent Sexual Abuse  History of Preadolescent Sexual Abuse: .0 (12/29/2023  9:00 AM)    Psychological Disease  Attention Deficit Disorder, Obsessive Compulsive Disorder, Bipolar, Schizophrenia: 0 (12/29/2023  9:00 AM)  Depression: 0 (12/29/2023  9:00 AM)    Total Score  Total Score: 0 (12/29/2023  9:00 AM)    Total Score Risk Category  TOTAL SCORE CATEGORY: Low Risk (0-3) (12/29/2023  9:00 AM)        Pain Assessment:  Analgesia  What was your pain level on average during the past week?: 3  What was your pain level at its worst during the past week?: 5  What percentage of your pain has been relieved during the past week?: 50 %  Is the amount of pain relief you are now obtaining from your current pain relievers enough to make a real difference in your life?: Y  Query to Clinician: Is the patient's pain relief clinically significant?: Yes    Activities of Daily Living  Physical Functioning: Better  Family Relationships: Better  Social Relationships: Better  Mood: Better  Sleep Patterns: Better  Overall Functioning: Better    Adverse Events  Is patient experiencing any side effects from current pain relievers?: N  Patient's Overall Severity of Side Effects: None      Assessment  Is your overall impression that this patient is benefiting from opioid therapy?: Yes  Specific Analgesic Plan: Continue present regimen    and Sleep Aids:   What is the patient's goal of therapy? To be able to get uninterrupted sleep for 6-8 hours during the night without being unduly tired, dizzy or drowsy during the day    Is this being achieved with current treatment? yes    Activities of Daily  Living:   Is your overall impression that this patient is benefiting (symptom reduction outweighs side effects) from sleep aid therapy? Yes     1. Physical Functioning: Better  2. Family Relationship: Better  3. Social Relationship: Better  4. Mood: Better  5. Sleep Patterns: Better  6. Overall Function: Better    Scribe Attestation  By signing my name below, Jairon VERNON Scribe   attest that this documentation has been prepared under the direction and in the presence of Griffin Sharma MD.

## 2023-12-29 NOTE — PATIENT INSTRUCTIONS
BMI was above normal measurement. Current weight: 132 kg (290 lb 9.6 oz)  Weight change since last visit (-) denotes wt loss 5.6 lbs   Weight loss needed to achieve BMI 25: 101.5 Lbs  Weight loss needed to achieve BMI 30: 63.7 Lbs  Advised to Increase physical activity.

## 2024-01-05 ENCOUNTER — TREATMENT (OUTPATIENT)
Dept: OCCUPATIONAL THERAPY | Facility: CLINIC | Age: 64
End: 2024-01-05
Payer: COMMERCIAL

## 2024-01-05 DIAGNOSIS — M65.4 DE QUERVAIN'S TENOSYNOVITIS, LEFT: ICD-10-CM

## 2024-01-05 PROCEDURE — 97110 THERAPEUTIC EXERCISES: CPT | Mod: GO | Performed by: OCCUPATIONAL THERAPIST

## 2024-01-05 PROCEDURE — 97140 MANUAL THERAPY 1/> REGIONS: CPT | Mod: GO | Performed by: OCCUPATIONAL THERAPIST

## 2024-01-05 ASSESSMENT — PAIN DESCRIPTION - DESCRIPTORS: DESCRIPTORS: SHARP

## 2024-01-05 ASSESSMENT — PAIN SCALES - GENERAL: PAINLEVEL_OUTOF10: 6

## 2024-01-05 ASSESSMENT — PAIN - FUNCTIONAL ASSESSMENT: PAIN_FUNCTIONAL_ASSESSMENT: 0-10

## 2024-01-05 NOTE — PROGRESS NOTES
"  Occupational Therapy     Occupational Therapy Treatment  Name: Terry Lee   MRN: [id number]   : 1960   Date:  2024  Time Calculation  Start Time: 020  Stop Time: 245  Time Calculation (min): 40 min     Current Problem:  De Quervain's tenosynovitis, left     Visit Number: 2    Insurance Authorization Request: DELFINA  TRADITIONAL 30V PT OT COPAY 25 DED 0  COVERAGE 85 OOP 1600(0) 3200(0) NO AUTH REQ     Assessment:   Patient is progressing appropriately demonstrated by decreased left wrist pain. He remains limited by residual pain and muscle weakness. Added dry needling to today's session to promote tissue healing. Patient will continue to benefit from skilled OT for pain management and progressive strengthening to support full return to all ADL/IADL, work, and leisure activities.         Plan:  Outpatient Plan  OT Plan: Modalities, dry needling, therapeutic exercise, therapeutic activity, manual therapy, neuromotor re-education  Frequency: 1x/wk  Duration: 6 weeks  Rehab Potential: Good  Plan of Care Agreement: Patient      Subjective   General: \"The pain is maybe 10% better. In the morning when I put on my socks on, it still hurts.\"       Precautions: None      Pain Assessment:  Location: 1st dorsal compartment  2-3/10 at rest, 6/10 at highest (was 8)  Description: sharp       HEP Adherence/Understanding: Good     Objective  UE Assessment  Observation: No edema presented in left wrist     Palpation: Mild tenderness upon palpation over 1st dorsal compartment      Range of Motion (in degrees)  Shoulder AROM: WNL     PROM: WNL      Elbow AROM: WNL      PROM: WNL      Wrist AROM: WNL       PROM: WNL      Digits AROM: WNL   PROM: WNL      Thumb AROM: WNL    PROM: WNL      Sensation: No paresthesia      Strength:   strength: R 70#  L 65#  Lateral pinch strength: R 18#  L 16# - reports pain   3 point pinch strength: R 14#   L 12# - reports pain    Rapid Exchange : N/A     Coordination: TBT   "   Special Test: Finkelstein Sign (+)    Treatment Interventions:   Therapeutic Exercise  Therapeutic Exercise Performed: Yes    Instructed patient to self perform wrist tenodesis, radial/ulnar deviation, and wrist circular motion CW/CCW in conjunction with fluido therapy    Manual Therapy Performed: Yes   Therapist completed myofacial release over 1st dorsal compartment using hawks  tool.   Dry needling performed this date to 1st dorsal compartment. Informed consent obtained from patient and in medical chart. Area prepared with isopropyl alcohol in sterile fashion. 0.14*15 mm needles inserted, manual rotatory technique performed for further management of soft tissue dysfunction. All needles removed, area inspected for adverse symptoms, none noted, patient educated in post- dry needling management and expected symptoms from treatment. all patient questions answered.        Tolerance of session: No difficulty     Outcome Measures:  Quick Dash Score: at eval 34.09%     OP EDUCATION:  Education  Individual(s) Educated: Patient  Education Provided: Anatomy & Physiology, Diagnosis & Precautions, Symptom management, Risk and benefits of OT discussed with patient or other, POC discussed and agreed upon  Patient/Caregiver Demonstrated Understanding: yes  Plan of Care Discussed and Agreed Upon: yes  Patient Response to Education: Patient/Caregiver Verbalized Understanding of Information, Patient/Caregiver Performed Return Demonstration of Exercises/Activities, Patient/Caregiver Asked Appropriate Questions    Goals:   By discharge (6 weeks) Terry Lee  will achieve the following goals:      1. Patient to report pain 0/10 when performing work tasks  2. Patient to demonstrate lateral pinch strength to increase 2# without reporting wrist pain.    3. Patient to demonstrate proper technique and competence with HEP   4. Patient to score disability rate less than 10% on Quick DASH

## 2024-01-11 ENCOUNTER — TREATMENT (OUTPATIENT)
Dept: OCCUPATIONAL THERAPY | Facility: CLINIC | Age: 64
End: 2024-01-11
Payer: COMMERCIAL

## 2024-01-11 DIAGNOSIS — M65.4 DE QUERVAIN'S TENOSYNOVITIS, LEFT: ICD-10-CM

## 2024-01-11 PROCEDURE — 97140 MANUAL THERAPY 1/> REGIONS: CPT | Mod: GO | Performed by: OCCUPATIONAL THERAPIST

## 2024-01-11 PROCEDURE — 97110 THERAPEUTIC EXERCISES: CPT | Mod: GO | Performed by: OCCUPATIONAL THERAPIST

## 2024-01-11 ASSESSMENT — PAIN SCALES - GENERAL: PAINLEVEL_OUTOF10: 6

## 2024-01-11 ASSESSMENT — PAIN DESCRIPTION - DESCRIPTORS: DESCRIPTORS: SHARP

## 2024-01-11 ASSESSMENT — PAIN - FUNCTIONAL ASSESSMENT: PAIN_FUNCTIONAL_ASSESSMENT: 0-10

## 2024-01-11 NOTE — PROGRESS NOTES
"  Occupational Therapy     Occupational Therapy Treatment  Name: Terry Lee   MRN: [id number]   : 1960   Date:  2024  Time Calculation  Start Time: 0430  Stop Time: 0510  Time Calculation (min): 40 min     Current Problem:  De Quervain's tenosynovitis, left     Visit Number: 3    Insurance Authorization Request: ANTHEM  TRADITIONAL 30V PT OT COPAY 25 DED 0  COVERAGE 85 OOP 1600(0) 3200(0) NO AUTH REQ     Assessment:   Patient is progressing appropriately demonstrated by decreased left wrist pain at rest. He remains limited by residual pain and muscle weakness. Added e-stim to dry needling to today's session to further promote tissue healing. Patient will continue to benefit from skilled OT for pain management and progressive strengthening to support full return to all ADL/IADL, work, and leisure activities.      Plan:  Outpatient Plan  OT Plan: Modalities, dry needling, therapeutic exercise, therapeutic activity, manual therapy, neuromotor re-education  Frequency: 1x/wk  Duration: 6 weeks  Rehab Potential: Good  Plan of Care Agreement: Patient      Subjective   General: \"In the morning the pain seems to be a little bit better. But when I use my hand the wrong way, it still hurts. \"       Precautions: None      Pain Assessment:  Location: 1st dorsal compartment  0/10 at rest (was 2-3), 6/10 at highest   Description: sharp       HEP Adherence/Understanding: Good     Objective  UE Assessment  Observation: No edema presented in left wrist     Palpation: Mild tenderness upon palpation over 1st dorsal compartment      Range of Motion (in degrees)  Shoulder AROM: WNL     PROM: WNL      Elbow AROM: WNL      PROM: WNL      Wrist AROM: WNL       PROM: WNL      Digits AROM: WNL   PROM: WNL      Thumb AROM: WNL    PROM: WNL      Sensation: No paresthesia      Strength:   strength: R 70#  L 65#  Lateral pinch strength: R 18#  L 16# - reports pain   3 point pinch strength: R 14#   L 12# - reports pain  "   Rapid Exchange : N/A     Coordination: TBT     Special Test: Finkelstein Sign (+)    Treatment Interventions:   Therapeutic Exercise  Therapeutic Exercise Performed: Yes    Instructed patient to self perform sign language, wrist tenodesis, radial/ulnar deviation, and wrist circular motion CW/CCW in conjunction with fluido therapy  Instructed patient to perform APL/EPB gentle gliding.     Manual Therapy Performed: Yes   Therapist completed myofacial release over 1st dorsal compartment using hawks  tool.   Dry needling performed this date to 1st dorsal compartment. Informed consent obtained from patient and in medical chart. Area prepared with isopropyl alcohol in sterile fashion. 0.14*15 mm needles inserted, manual rotatory technique and e-stim performed  for further management of soft tissue dysfunction. All needles removed, area inspected for adverse symptoms, none noted, patient educated in post- dry needling management and expected symptoms from treatment. all patient questions answered.        Tolerance of session: No difficulty     Outcome Measures:  Quick Dash Score: at eval 34.09%     OP EDUCATION:  Education  Individual(s) Educated: Patient  Education Provided: Anatomy & Physiology, Diagnosis & Precautions, Symptom management, Risk and benefits of OT discussed with patient or other, POC discussed and agreed upon  Patient/Caregiver Demonstrated Understanding: yes  Plan of Care Discussed and Agreed Upon: yes  Patient Response to Education: Patient/Caregiver Verbalized Understanding of Information, Patient/Caregiver Performed Return Demonstration of Exercises/Activities, Patient/Caregiver Asked Appropriate Questions    Goals:   By discharge (6 weeks) Terry Lee  will achieve the following goals:      1. Patient to report pain 0/10 when performing work tasks  2. Patient to demonstrate lateral pinch strength to increase 2# without reporting wrist pain.    3. Patient to demonstrate proper  technique and competence with HEP   4. Patient to score disability rate less than 10% on Quick DASH

## 2024-01-16 ENCOUNTER — APPOINTMENT (OUTPATIENT)
Dept: OCCUPATIONAL THERAPY | Facility: CLINIC | Age: 64
End: 2024-01-16
Payer: COMMERCIAL

## 2024-01-23 ENCOUNTER — APPOINTMENT (OUTPATIENT)
Dept: OCCUPATIONAL THERAPY | Facility: CLINIC | Age: 64
End: 2024-01-23
Payer: COMMERCIAL

## 2024-01-24 ENCOUNTER — APPOINTMENT (OUTPATIENT)
Dept: OCCUPATIONAL THERAPY | Facility: CLINIC | Age: 64
End: 2024-01-24
Payer: COMMERCIAL

## 2024-02-05 ENCOUNTER — APPOINTMENT (OUTPATIENT)
Dept: ORTHOPEDIC SURGERY | Facility: CLINIC | Age: 64
End: 2024-02-05
Payer: COMMERCIAL

## 2024-02-08 DIAGNOSIS — N52.9 ERECTILE DYSFUNCTION, UNSPECIFIED ERECTILE DYSFUNCTION TYPE: ICD-10-CM

## 2024-02-10 RX ORDER — TADALAFIL 5 MG/1
5 TABLET ORAL DAILY
Qty: 30 TABLET | Refills: 0 | Status: SHIPPED | OUTPATIENT
Start: 2024-02-10 | End: 2024-03-21 | Stop reason: SDUPTHER

## 2024-02-12 ENCOUNTER — OFFICE VISIT (OUTPATIENT)
Dept: ORTHOPEDIC SURGERY | Facility: CLINIC | Age: 64
End: 2024-02-12
Payer: COMMERCIAL

## 2024-02-12 DIAGNOSIS — M65.4 DE QUERVAIN'S TENOSYNOVITIS, LEFT: Primary | ICD-10-CM

## 2024-02-12 PROCEDURE — 99214 OFFICE O/P EST MOD 30 MIN: CPT | Performed by: ORTHOPAEDIC SURGERY

## 2024-02-12 PROCEDURE — 3008F BODY MASS INDEX DOCD: CPT | Performed by: ORTHOPAEDIC SURGERY

## 2024-02-12 PROCEDURE — 1036F TOBACCO NON-USER: CPT | Performed by: ORTHOPAEDIC SURGERY

## 2024-02-12 NOTE — PROGRESS NOTES
History present illness: Patient presents today for evaluation of left wrist de Quervain's disease.  He describes persistence of pain.  He did 3 formal visits of therapy and has been doing daily program for home exercises.  He is not experiencing as much relief as he would like.      Past medical history: The patient's past medical history, family history, social history, and review of systems were documented on the patient medical intake.  The updated data was reviewed in the electronic medical record.  History is negative except otherwise stated in history of present illness.        Physical examination:  General: Alert and oriented to person, place, and time.  No acute distress and breathing comfortably: Pleasant and cooperative with examination.  HEENT: Head is normocephalic and atraumatic.  Neck: Supple, no visible swelling.  Cardiovascular: No palpable tachycardia  Lungs: No audible wheezing or labored breathing  Abdomen: Nondistended.  Extremities: Evaluation of the left upper extremity finds the patient had palpable radial artery at the wrist with brisk capillary refill to all digits.  Patient has intact sensation to axillary radial median and ulnar nerves.  There are no open wounds.  There are no signs of infection.  There is no evidence of lymphedema or lymphatic streaking.  The patient has supple compartments to left arm forearm and hand.  Positive Finkelstein's left wrist.      Radiology:      Assessment: Left wrist de Quervain's disease      Plan: Treatment options were discussed.  We talked about operative and nonoperative strategies.  Patient elects for observation for now.  He is leaning toward surgical solutions and would be targeting early June.  He is going to keep on with a home exercise program for Decore veins disease and see us back in May to discuss how he is doing and potentially book for surgery.  We talked about intraoperative techniques and postoperative protocols.  No x-rays necessary  upon return.        Procedure:

## 2024-02-20 ENCOUNTER — TELEPHONE (OUTPATIENT)
Dept: ORTHOPEDIC SURGERY | Facility: CLINIC | Age: 64
End: 2024-02-20
Payer: COMMERCIAL

## 2024-02-20 NOTE — TELEPHONE ENCOUNTER
RETURNED PATIENTS CALL AT THIS TIME, HE WANTED TO SCHEDULE SURGERY, CALL WAS TRANSFERRED TO SCHEDULING TO MAKE A FOLLOW UP VISIT FOR INSURANCE PURPOSES.

## 2024-02-25 DIAGNOSIS — K21.9 GASTRO-ESOPHAGEAL REFLUX DISEASE WITHOUT ESOPHAGITIS: ICD-10-CM

## 2024-02-25 RX ORDER — FAMOTIDINE 20 MG/1
20 TABLET, FILM COATED ORAL DAILY
Qty: 90 TABLET | Refills: 3 | Status: SHIPPED | OUTPATIENT
Start: 2024-02-25

## 2024-02-26 ENCOUNTER — OFFICE VISIT (OUTPATIENT)
Dept: ORTHOPEDIC SURGERY | Facility: CLINIC | Age: 64
End: 2024-02-26
Payer: COMMERCIAL

## 2024-02-26 DIAGNOSIS — M65.4 DE QUERVAIN'S DISEASE (TENOSYNOVITIS): Primary | ICD-10-CM

## 2024-02-26 PROCEDURE — 1036F TOBACCO NON-USER: CPT | Performed by: ORTHOPAEDIC SURGERY

## 2024-02-26 PROCEDURE — 99214 OFFICE O/P EST MOD 30 MIN: CPT | Performed by: ORTHOPAEDIC SURGERY

## 2024-02-26 PROCEDURE — 3008F BODY MASS INDEX DOCD: CPT | Performed by: ORTHOPAEDIC SURGERY

## 2024-02-26 ASSESSMENT — PAIN - FUNCTIONAL ASSESSMENT: PAIN_FUNCTIONAL_ASSESSMENT: 0-10

## 2024-02-26 ASSESSMENT — PAIN SCALES - GENERAL: PAINLEVEL_OUTOF10: 5 - MODERATE PAIN

## 2024-02-26 NOTE — H&P (VIEW-ONLY)
History present illness: Patient presents today for evaluation of worsening symptoms of left wrist Decore veins disease.  He has had failure of nonoperative treatment strategies and wishes to discuss surgical options.  Right-hand-dominant.  No blood thinners.      Past medical history: The patient's past medical history, family history, social history, and review of systems were documented on the patient medical intake.  The updated data was reviewed in the electronic medical record.  History is negative except otherwise stated in history of present illness.        Physical examination:  General: Alert and oriented to person, place, and time.  No acute distress and breathing comfortably: Pleasant and cooperative with examination.  HEENT: Head is normocephalic and atraumatic.  Neck: Supple, no visible swelling.  Cardiovascular: No palpable tachycardia  Lungs: No audible wheezing or labored breathing  Abdomen: Nondistended.  Extremities: Evaluation of the left upper extremity finds the patient had palpable radial artery at the wrist with brisk capillary refill to all digits.  Patient has intact sensation to axillary radial median and ulnar nerves.  There are no open wounds.  There are no signs of infection.  There is no evidence of lymphedema or lymphatic streaking.  The patient has supple compartments to left arm forearm and hand.  Focal tenderness and swelling to left wrist at first dorsal compartment.  Positive Finkelstein's maneuver.      Radiology:      Assessment: Left wrist de Quervain's disease with failure of nonoperative treatment strategies      Plan: Treatment options were discussed.  We talked about operative and nonoperative strategies.  We talked about intraoperative techniques and postoperative protocols.  Patient elects to proceed forth with surgery by way of left wrist first dorsal compartment tendon sheath release through wide-awake approach to anesthesia.        Procedure:

## 2024-03-04 NOTE — DISCHARGE INSTRUCTIONS
Medication given may have significant effects after discharge. Therefore on the day of surgery:  1) You must be accompanied by a responsible adult upon discharge and for 24 hours after surgery.  Do not drive a motor vehicle, operate machinery, power tools or appliance, drink alcoholic beverages, or make critical decisions for 24 hours.  2) Be aware of dizziness, which may cause a fall. Change positions slowly.  3) Eating: you may resume your regular diet but it is better to increase intake slowly with mild foods and working up to your regular diet. No greasy, fried or spicy foods today.  4) Nausea/Vomiting: Nausea and vomiting may occur as you become more active or begin to increase food intake. If this should happen, decrease activity and return to liquids. If the problem persists, call your surgeon  5) Pain: Your surgeon may have given you a prescription for pain medication. Take pain medication with food as prescribed. Pain medication may cause constipation, so drink plenty of fluids. If your pain medication does not provide adequate relief, call your surgeon  6) Urinating: Notify your surgeon if you have not urinated within 12 hours after discharge  7) Ice: Apply ice to operative site for 20 min 5-6 times a day or use Polar care as instructed  8) Dressing:   []  Remove dressing in 3 Days   []  Leave open to air or apply simple Band-Aid after initial dressing is taken off and incision is dry. (If Steri-Strips are applied, leave them in place.)   []  No baths, hots tubs, pools, or submerging in fresh water sources. Okay to begin showering and normal hand washing after dressing removal.     [x]  Leave dressing in place. Keep dressing/ incision clean and dry. - Can remove in 7 days.      9) Activity:    Shoulder/ Elbow/Hand:   [x]  Elevate extremity    []  Sling   [] At all times (except for exercises and showering)  [] As needed only for comfort   [] Begin daily motion exercises out of sling as instructed   [x]   Bend and flex fingers/wrist/elbow frequently   [] Non-weight bearing/No lifting/gripping/squeezing to the surgical limb   [x] No lifting greater than 1 lb until follow-up visit      10) Begin physical therapy if advised by your physician:   [] Before returning to see you doctor    [x] Will discuss possible need at follow-up visit   [] Will be paired with your follow-up visit in [place]    11) Call your doctor at [phone] for an appointment (or follow up as scheduled)    Contact [facility] office if  Increased redness, swelling, drainage of any kind, and/or pain to surgery site.  As well as new onset fevers and or chills.  These could signify an infection.  Calf or thigh tenderness to touch as well as increased swelling or redness.  This could signify a clot formation.  Numbness or tingling to an area around the incision site or below the incision site (toes). Or if the operative extremity becomes cold, blue.  Any rash appears, increased  or new onset nausea/vomiting occur.  This may indicate a reaction to a medication.  Temp is 38.5 C (101F)  12) If you have any concerns or questions, please call [facility] surgeon on call. The 24- hour phone is [phone]  13) If you are unable to contact your surgeon, in an emergency situation, go to the nearest hospital

## 2024-03-07 ENCOUNTER — ANESTHESIA EVENT (OUTPATIENT)
Dept: OPERATING ROOM | Facility: HOSPITAL | Age: 64
End: 2024-03-07
Payer: COMMERCIAL

## 2024-03-07 RX ORDER — HYDROCODONE BITARTRATE AND ACETAMINOPHEN 5; 325 MG/1; MG/1
1 TABLET ORAL EVERY 8 HOURS PRN
Qty: 6 TABLET | Refills: 0 | Status: SHIPPED | OUTPATIENT
Start: 2024-03-07 | End: 2024-03-09

## 2024-03-08 ENCOUNTER — HOSPITAL ENCOUNTER (OUTPATIENT)
Facility: HOSPITAL | Age: 64
Setting detail: OUTPATIENT SURGERY
Discharge: HOME | End: 2024-03-08
Attending: ORTHOPAEDIC SURGERY | Admitting: ORTHOPAEDIC SURGERY
Payer: COMMERCIAL

## 2024-03-08 ENCOUNTER — ANESTHESIA (OUTPATIENT)
Dept: OPERATING ROOM | Facility: HOSPITAL | Age: 64
End: 2024-03-08
Payer: COMMERCIAL

## 2024-03-08 VITALS
BODY MASS INDEX: 38.57 KG/M2 | DIASTOLIC BLOOD PRESSURE: 99 MMHG | HEART RATE: 55 BPM | HEIGHT: 73 IN | SYSTOLIC BLOOD PRESSURE: 174 MMHG | WEIGHT: 291.01 LBS | RESPIRATION RATE: 18 BRPM | TEMPERATURE: 96.4 F | OXYGEN SATURATION: 94 %

## 2024-03-08 DIAGNOSIS — M65.4 RADIAL STYLOID TENOSYNOVITIS (DE QUERVAIN): ICD-10-CM

## 2024-03-08 DIAGNOSIS — G89.18 POST-OP PAIN: Primary | ICD-10-CM

## 2024-03-08 PROCEDURE — 3700000001 HC GENERAL ANESTHESIA TIME - INITIAL BASE CHARGE: Performed by: ORTHOPAEDIC SURGERY

## 2024-03-08 PROCEDURE — 3600000008 HC OR TIME - EACH INCREMENTAL 1 MINUTE - PROCEDURE LEVEL THREE: Performed by: ORTHOPAEDIC SURGERY

## 2024-03-08 PROCEDURE — 2500000004 HC RX 250 GENERAL PHARMACY W/ HCPCS (ALT 636 FOR OP/ED): Performed by: ANESTHESIOLOGY

## 2024-03-08 PROCEDURE — 2500000004 HC RX 250 GENERAL PHARMACY W/ HCPCS (ALT 636 FOR OP/ED)

## 2024-03-08 PROCEDURE — A4217 STERILE WATER/SALINE, 500 ML: HCPCS | Performed by: ORTHOPAEDIC SURGERY

## 2024-03-08 PROCEDURE — 3700000002 HC GENERAL ANESTHESIA TIME - EACH INCREMENTAL 1 MINUTE: Performed by: ORTHOPAEDIC SURGERY

## 2024-03-08 PROCEDURE — 2500000004 HC RX 250 GENERAL PHARMACY W/ HCPCS (ALT 636 FOR OP/ED): Performed by: ORTHOPAEDIC SURGERY

## 2024-03-08 PROCEDURE — 2500000005 HC RX 250 GENERAL PHARMACY W/O HCPCS: Performed by: ANESTHESIOLOGY

## 2024-03-08 PROCEDURE — 3600000003 HC OR TIME - INITIAL BASE CHARGE - PROCEDURE LEVEL THREE: Performed by: ORTHOPAEDIC SURGERY

## 2024-03-08 PROCEDURE — 7100000009 HC PHASE TWO TIME - INITIAL BASE CHARGE: Performed by: ORTHOPAEDIC SURGERY

## 2024-03-08 PROCEDURE — 7100000010 HC PHASE TWO TIME - EACH INCREMENTAL 1 MINUTE: Performed by: ORTHOPAEDIC SURGERY

## 2024-03-08 PROCEDURE — 25295 RELEASE WRIST/FOREARM TENDON: CPT | Performed by: ORTHOPAEDIC SURGERY

## 2024-03-08 PROCEDURE — 2500000005 HC RX 250 GENERAL PHARMACY W/O HCPCS

## 2024-03-08 PROCEDURE — 2500000004 HC RX 250 GENERAL PHARMACY W/ HCPCS (ALT 636 FOR OP/ED): Performed by: PHYSICIAN ASSISTANT

## 2024-03-08 RX ORDER — SODIUM CHLORIDE 0.9 G/100ML
IRRIGANT IRRIGATION AS NEEDED
Status: DISCONTINUED | OUTPATIENT
Start: 2024-03-08 | End: 2024-03-08 | Stop reason: HOSPADM

## 2024-03-08 RX ORDER — SODIUM CHLORIDE, SODIUM LACTATE, POTASSIUM CHLORIDE, CALCIUM CHLORIDE 600; 310; 30; 20 MG/100ML; MG/100ML; MG/100ML; MG/100ML
100 INJECTION, SOLUTION INTRAVENOUS CONTINUOUS
Status: DISCONTINUED | OUTPATIENT
Start: 2024-03-08 | End: 2024-03-08 | Stop reason: HOSPADM

## 2024-03-08 RX ADMIN — SODIUM CHLORIDE, POTASSIUM CHLORIDE, SODIUM LACTATE AND CALCIUM CHLORIDE 100 ML/HR: 600; 310; 30; 20 INJECTION, SOLUTION INTRAVENOUS at 08:20

## 2024-03-08 RX ADMIN — LIDOCAINE HYDROCHLORIDE,EPINEPHRINE BITARTRATE: 10; .01 INJECTION, SOLUTION INFILTRATION; PERINEURAL at 09:00

## 2024-03-08 ASSESSMENT — COLUMBIA-SUICIDE SEVERITY RATING SCALE - C-SSRS
2. HAVE YOU ACTUALLY HAD ANY THOUGHTS OF KILLING YOURSELF?: NO
6. HAVE YOU EVER DONE ANYTHING, STARTED TO DO ANYTHING, OR PREPARED TO DO ANYTHING TO END YOUR LIFE?: NO
1. IN THE PAST MONTH, HAVE YOU WISHED YOU WERE DEAD OR WISHED YOU COULD GO TO SLEEP AND NOT WAKE UP?: NO

## 2024-03-08 ASSESSMENT — PAIN SCALES - GENERAL
PAINLEVEL_OUTOF10: 3
PAINLEVEL_OUTOF10: 0 - NO PAIN
PAIN_LEVEL: 0

## 2024-03-08 ASSESSMENT — PAIN DESCRIPTION - DESCRIPTORS: DESCRIPTORS: ACHING

## 2024-03-08 ASSESSMENT — PAIN - FUNCTIONAL ASSESSMENT
PAIN_FUNCTIONAL_ASSESSMENT: 0-10
PAIN_FUNCTIONAL_ASSESSMENT: 0-10

## 2024-03-08 NOTE — ANESTHESIA POSTPROCEDURE EVALUATION
Patient: Terry Lee    Procedure Summary       Date: 03/08/24 Room / Location: [facility] OR 04 / [facility]    Anesthesia Start: 0942 Anesthesia Stop:     Procedure: LEFT DEQUERVAIN'S RELEASE WIDE AWAKE BLOCK DONE BY ANESTHESIA TEAM (Left: Wrist) Diagnosis:       Radial styloid tenosynovitis (de quervain)      (Radial styloid tenosynovitis (de quervain) [M65.4])    Surgeons: Crow Romero DO Responsible Provider: Mendez Saucedo MD    Anesthesia Type: regional, MAC ASA Status: 3            Anesthesia Type: regional, MAC    Vitals Value Taken Time   /88 03/08/24 1014   Temp 36.0 03/08/24 1014   Pulse 59 03/08/24 1014   Resp 18 03/08/24 1014   SpO2 94 03/08/24 1014       Anesthesia Post Evaluation    Patient location during evaluation: bedside  Patient participation: complete - patient participated  Level of consciousness: awake and alert  Pain score: 0  Pain management: adequate  Airway patency: patent  Cardiovascular status: acceptable  Respiratory status: acceptable  Hydration status: acceptable  Postoperative Nausea and Vomiting: none        No notable events documented.

## 2024-03-08 NOTE — OP NOTE
LEFT DEQUERVAIN'S RELEASE WIDE AWAKE BLOCK DONE BY ANESTHESIA TEAM (L) Operative Note     Date: 3/8/2024  OR Location: [facility]    Name: Terry Lee, : 1960, Age: 63 y.o., MRN: [id number], Sex: male        Preoperative diagnosis: Left de Quervain's tenosynovitis    Postoperative diagnosis: Left de Quervain's tenosynovitis.  Adhesion formation extensor pollicis brevis and abductor pollicis longus tendons left wrist.    Procedure planned: Release of first dorsal compartment tendon sheath, left wrist    Procedure performed: Release of first dorsal compartment tendon sheath, left wrist.  Lysis of adhesions extensor pollicis brevis tendon left wrist.  Lysis of adhesions abductor pollicis longus tendon left wrist.    Surgeon: Crow Romero D.O.    Assistant: WILD Kent  The physician assistant was present to the entire case. Given the nature of the disease process and the procedure to be performed a skilled surgical assistant was necessary during the case. The assistant was necessary in order to hold retractors and directly assist in the operation. A certified scrub tech was at the back table managing instruments and supplies for the surgical case.    Anesthesia: Local field block monitored by the anesthesia team with sedation    Estimated blood loss: Less than 10 mL    Drains: None    Tourniquet: None    Specimens: None    Implants: None    Indications: The patient presented to the office with painful tendinitis about the left radial wrist.  The patient underwent standard nonoperative treatment strategies for de Quervain's disease.  Despite this symptoms persisted.  After full discussion regarding risks benefits and alternatives the patient elected to forego any additional nonoperative measures in favor of left wrist de Quervain's release.  Informed consent was signed and placed in the chart.    Complications: None noted at the time of surgery    Description of operation: The patient was taken to the  operative suite and placed in the supine position on the operating table with the left arm extended to an arm board.  A timeout was performed and the left wrist was confirmed to be the operative site.  The patient was carefully positioned on the table in such a fashion as to pad all bony prominences and peripheral nerves.  The patient was then prepped and draped in the normal sterile fashion.  In the preoperative holding area a local field block was placed with a solution of lidocaine with epinephrine.  The block was working very well.  A short oblique incision was marked out about the radial styloid over the course of the first dorsal compartment tendons.  The 15 blade was used to incise skin.  Dissection was carried down to the subcutaneous plane.  Care was taken to identify and protect the dorsal sensory branches of the radial nerve as this was done.  We then used the 15 blade to open the first dorsal compartment tendon sheath just distal to the retinacular layer, staying on the dorsal portion of the first dorsal compartment tendon sheath.  Release was carried from the base of the first metacarpal up to a point approximately 7 cm proximal to the tip of the radial styloid.  Care was taken to identify sub-sheaths within the first dorsal compartment  slips of the extensor pollicis brevis and abductor pollicis longus and to discretely release them.  The patient was noted to have abundant adhesion formation involving the tendons of abductor pollicis longus and extensor pollicis brevis.  Discrete tenolysis was performed to the abductor pollicis longus tendon freeing up nice range of motion as judged both through passive traction and active range of motion.  Discrete tenolysis was performed to the extensor pollicis brevis tendon achieving nice active range of motion and excellent passive excursion.  Copious irrigation was then performed.  Interrupted nylon stitches were used to close the skin.  A bulky soft  dressing was placed.  The patient was then allowed to head to recovery in stable condition.  Overall the patient tolerated the procedure well.    Disposition: Stable to PACU        Crow Romero  Phone Number: [phone]

## 2024-03-08 NOTE — ANESTHESIA PREPROCEDURE EVALUATION
Terry Lee is a 63 y.o. male here for:      LEFT DEQUERVAIN'S RELEASE WIDE AWAKE BLOCK DONE BY ANESTHESIA TEAM  With Crow Romero DO  Pre-Op Diagnosis Codes:     * Radial styloid tenosynovitis (de quervain) [M65.4]    Lab Results   Component Value Date    HGB 13.2 (L) 09/08/2023    HCT 40.5 (L) 09/08/2023    WBC 5.9 09/08/2023     09/08/2023     09/08/2023    K 3.9 09/08/2023     09/08/2023    CREATININE 0.75 09/08/2023    BUN 21 09/08/2023       Social History     Substance and Sexual Activity   Drug Use Never      Tobacco Use: Low Risk  (3/8/2024)    Patient History    • Smoking Tobacco Use: Never    • Smokeless Tobacco Use: Never    • Passive Exposure: Not on file      Social History     Substance and Sexual Activity   Alcohol Use Not Currently    Comment: SOCIAL        No Known Allergies    Current Outpatient Medications   Medication Instructions   • acetaminophen (TYLENOL) 325 mg, oral, Every 6 hours PRN   • cyanocobalamin, vitamin B-12, 1,000 mcg tablet, sublingual sublingual   • famotidine (PEPCID) 20 mg, oral, Daily   • finasteride (PROSCAR) 5 mg, oral, Daily   • HYDROcodone-acetaminophen (Norco) 5-325 mg tablet 1 tablet, oral, Every 6 hours PRN   • HYDROcodone-acetaminophen (Norco) 5-325 mg tablet 1 tablet, oral, Every 8 hours PRN   • multivit with minerals/lutein (MULTIVITAMIN 50 PLUS ORAL) 1 tablet, oral, Daily   • naloxone (Narcan) 4 mg/0.1 mL nasal spray Use as directed   • tadalafil (CIALIS) 5 mg, oral, Daily   • tamsulosin (FLOMAX) 0.4 mg, oral, Daily   • valsartan-hydrochlorothiazide (Diovan-HCT) 160-12.5 mg tablet 1 tablet, oral, Daily   • zolpidem (AMBIEN) 10 mg, oral, Nightly PRN       Past Medical History:   Diagnosis Date   • Arthritis    • Benign prostatic hyperplasia with lower urinary tract symptoms     Benign localized hyperplasia of prostate with urinary obstruction and lower urinary tract symptoms   • COVID-19     VACCINATED   • De Quervain's disease  (tenosynovitis)    • GERD (gastroesophageal reflux disease)    • Hyperlipidemia    • Hypertension    • Insomnia    • Intra-abdominal abscess (CMS/[facility]) 02/15/2023   • Joint pain    • Nephrolithiasis    • Personal history of other endocrine, nutritional and metabolic disease     History of vitamin D deficiency   • Personal history of other specified conditions     History of gross hematuria   • Sleep apnea     RESOLVED   • Wears glasses        Past Surgical History:   Procedure Laterality Date   • CHOLECYSTECTOMY  06/27/2016    Cholecystectomy   • COLONOSCOPY  05/18/2021    Complete Colonoscopy   • EYE SURGERY  01/22/2016    LASIK   • GASTRIC BYPASS  01/22/2016    Gastric Surgery For Morbid Obesity Gastric Bypass   • KNEE ARTHROSCOPY W/ DEBRIDEMENT Left 01/22/2016    Arthroscopy Knee   • OTHER SURGICAL HISTORY  01/22/2016    Single Ligation Of Internal Hemorrhoids   • OTHER SURGICAL HISTORY  02/26/2020    Vasectomy       Family History   Problem Relation Name Age of Onset   • Diabetes Mother     • Diabetes Father     • Prostate cancer Father     • Prostate cancer Mother's Brother         Relevant Problems   Cardiovascular   (+) Essential hypertension   (+) Other chest pain      Endocrine   (+) Class 2 severe obesity due to excess calories with serious comorbidity and body mass index (BMI) of 38.0 to 38.9 in adult (CMS/[facility])      GI   (+) GERD (gastroesophageal reflux disease)      /Renal   (+) Left nephrolithiasis      Neuro/Psych   (+) Piriformis syndrome of both sides      Pulmonary   (+) SOB (shortness of breath) on exertion      Musculoskeletal   (+) Chronic bilateral low back pain without sciatica   (+) Knee osteoarthritis   (+) Primary localized osteoarthrosis of multiple sites   (+) Primary osteoarthritis of both knees      Infectious Disease   (+) Infected sebaceous cyst   (+) Onychomycosis of toenail      Other   (+) Generalized arthritis       Visit Vitals  BP (!) 167/94   Pulse 73   Temp 36.3 °C (97.3 °F)  "(Temporal)   Resp 16   Ht 1.854 m (6' 1\")   Wt 132 kg (291 lb 0.1 oz)   SpO2 97%   BMI 38.39 kg/m²   Smoking Status Never   BSA 2.61 m²       NPO Details:  NPO/Void Status  Carbohydrate Drink Given Prior to Surgery? : N  Date of Last Liquid: 03/07/24  Time of Last Liquid: 2300  Date of Last Solid: 03/07/24  Time of Last Solid: 2100  Last Intake Type: Clear fluids  Time of Last Void: 0819        Physical Exam    Airway  Mallampati: II     Cardiovascular - normal exam     Dental - normal exam     Pulmonary - normal exam     Abdominal   (+) obese  Abdomen: soft           Anesthesia Plan    History of general anesthesia?: yes  History of complications of general anesthesia?: no    ASA 3     regional and MAC     intravenous induction   Anesthetic plan and risks discussed with patient.    Plan discussed with CRNA.  "

## 2024-03-08 NOTE — ANESTHESIA PROCEDURE NOTES
Peripheral Block    Start time: 3/8/2024 9:00 AM  End time: 3/8/2024 9:05 AM  Reason for block: at surgeon's request  Staffing  Performed: attending   Authorized by: Mendez Saucedo MD    Performed by: Mendez Saucedo MD  Preanesthetic Checklist  Completed: patient identified, IV checked, site marked, risks and benefits discussed, surgical consent, monitors and equipment checked, pre-op evaluation and timeout performed   Timeout performed at: 3/8/2024 9:00 AM  Peripheral Block  Patient position: sitting  Prep: alcohol swabs  Anesthesia block type: subqutaneous local.  Laterality: left  Injection technique: single-shot  Local infiltration: lidocaine  Infiltration strength: 1 %  Needle  Needle type: long-bevel   Needle gauge: 25.  Needle length: 2.5.  Assessment  Injection assessment: negative aspiration for heme, no paresthesia on injection and incremental injection  Paresthesia pain: none  Heart rate change: no  Slow fractionated injection: no  Additional Notes  Local infiltration:    After skin prep with alcohol 20mL of 1% lidocaine with epinephrine and sodium bicarbonate as additives was deposited subcutaneously at the level of the distal radius on the lateral side in the location of the planned surgical incision. Patient tolerated the procedure well.  No complications.

## 2024-03-19 ENCOUNTER — OFFICE VISIT (OUTPATIENT)
Dept: ORTHOPEDIC SURGERY | Facility: CLINIC | Age: 64
End: 2024-03-19
Payer: COMMERCIAL

## 2024-03-19 DIAGNOSIS — M65.4 DE QUERVAIN'S DISEASE (TENOSYNOVITIS): Primary | ICD-10-CM

## 2024-03-19 PROCEDURE — 3008F BODY MASS INDEX DOCD: CPT | Performed by: ORTHOPAEDIC SURGERY

## 2024-03-19 PROCEDURE — 99024 POSTOP FOLLOW-UP VISIT: CPT | Performed by: ORTHOPAEDIC SURGERY

## 2024-03-19 PROCEDURE — 1036F TOBACCO NON-USER: CPT | Performed by: ORTHOPAEDIC SURGERY

## 2024-03-19 NOTE — PROGRESS NOTES
3/19/2024    Chief Complaint   Patient presents with    Left Wrist - Post-op     Lt DeQuervain's release  DOS: 3/8/24       History of Present Illness:  Patient Terry Lee , 63 y.o. male, presents today, 3/19/2024, for evaluation of left wrist  de Quervain's release, 2 weeks postop .  Terry has done well in the interim since surgery.  Minimal soreness discomfort.  Overall symptoms already improved.       Review of Systems:   GENERAL: Negative  GI: Negative  MUSCULOSKELETAL: See HPI  SKIN: Negative  NEURO:  Negative     Physical Exam:  GENERAL:  Alert and oriented to person, place, and time.  No acute distress and breathing comfortably; pleasant and cooperative with the examination.  HEENT:  Head is normocephalic and atraumatic.  NECK:  Supple, no visible swelling.  CARDIOVASCULAR:  No palpable tachycardia.  LUNGS:  No audible wheezing or labored breathing.  ABDOMEN:  Nondistended.  Extremities: The surgical incision is clean, dry, intact, and appears to be healing well.  No active bleeding, erythema, warmth, drainage, or signs of infection.  Appropriate functional ROM demonstrated with flexion/extension of the digits, and flexion/extension/pronosupination of the wrist.     Imaging/Test Results:  None.     Assessment:  Left de Quervain's release, 2 weeks postop.     Plan:  Sutures were removed in the office today.  The patient can begin to weight bear as tolerated.  We discussed and reviewed home exercise program for range of motion recovery, scar massage, and desensitization techniques.  They can return to activities as tolerated.  We will also give him formal therapy referral.  The patient will follow-up with our office in 6 weeks.  All patient questions answered at today's visit.    Anu Del Angel PA-C

## 2024-03-21 DIAGNOSIS — N52.9 ERECTILE DYSFUNCTION, UNSPECIFIED ERECTILE DYSFUNCTION TYPE: ICD-10-CM

## 2024-03-22 RX ORDER — TADALAFIL 5 MG/1
5 TABLET ORAL DAILY
Qty: 30 TABLET | Refills: 11 | Status: SHIPPED | OUTPATIENT
Start: 2024-03-22 | End: 2025-03-17

## 2024-03-27 ENCOUNTER — OFFICE VISIT (OUTPATIENT)
Dept: PRIMARY CARE | Facility: CLINIC | Age: 64
End: 2024-03-27
Payer: COMMERCIAL

## 2024-03-27 VITALS
SYSTOLIC BLOOD PRESSURE: 132 MMHG | WEIGHT: 295 LBS | RESPIRATION RATE: 18 BRPM | TEMPERATURE: 97 F | DIASTOLIC BLOOD PRESSURE: 80 MMHG | HEIGHT: 73 IN | OXYGEN SATURATION: 97 % | HEART RATE: 65 BPM | BODY MASS INDEX: 39.1 KG/M2

## 2024-03-27 DIAGNOSIS — I10 ESSENTIAL HYPERTENSION: Primary | ICD-10-CM

## 2024-03-27 DIAGNOSIS — N40.1 BENIGN PROSTATIC HYPERPLASIA (BPH) WITH URINARY URGE INCONTINENCE: ICD-10-CM

## 2024-03-27 DIAGNOSIS — M19.91 PRIMARY LOCALIZED OSTEOARTHROSIS OF MULTIPLE SITES: ICD-10-CM

## 2024-03-27 DIAGNOSIS — M54.50 CHRONIC BILATERAL LOW BACK PAIN WITHOUT SCIATICA: ICD-10-CM

## 2024-03-27 DIAGNOSIS — F51.04 CHRONIC INSOMNIA: ICD-10-CM

## 2024-03-27 DIAGNOSIS — N39.41 BENIGN PROSTATIC HYPERPLASIA (BPH) WITH URINARY URGE INCONTINENCE: ICD-10-CM

## 2024-03-27 DIAGNOSIS — E66.01 CLASS 2 SEVERE OBESITY DUE TO EXCESS CALORIES WITH SERIOUS COMORBIDITY AND BODY MASS INDEX (BMI) OF 38.0 TO 38.9 IN ADULT (MULTI): ICD-10-CM

## 2024-03-27 DIAGNOSIS — G89.29 CHRONIC BILATERAL LOW BACK PAIN WITHOUT SCIATICA: ICD-10-CM

## 2024-03-27 PROCEDURE — 3079F DIAST BP 80-89 MM HG: CPT | Performed by: FAMILY MEDICINE

## 2024-03-27 PROCEDURE — 1036F TOBACCO NON-USER: CPT | Performed by: FAMILY MEDICINE

## 2024-03-27 PROCEDURE — 3075F SYST BP GE 130 - 139MM HG: CPT | Performed by: FAMILY MEDICINE

## 2024-03-27 PROCEDURE — 3008F BODY MASS INDEX DOCD: CPT | Performed by: FAMILY MEDICINE

## 2024-03-27 PROCEDURE — 99213 OFFICE O/P EST LOW 20 MIN: CPT | Performed by: FAMILY MEDICINE

## 2024-03-27 RX ORDER — HYDROCODONE BITARTRATE AND ACETAMINOPHEN 5; 325 MG/1; MG/1
1 TABLET ORAL EVERY 6 HOURS PRN
Qty: 100 TABLET | Refills: 0 | Status: SHIPPED | OUTPATIENT
Start: 2024-03-27

## 2024-03-27 RX ORDER — ZOLPIDEM TARTRATE 10 MG/1
10 TABLET ORAL NIGHTLY PRN
Qty: 30 TABLET | Refills: 2 | Status: SHIPPED | OUTPATIENT
Start: 2024-03-27

## 2024-03-27 ASSESSMENT — ENCOUNTER SYMPTOMS
PALPITATIONS: 0
ABDOMINAL PAIN: 0
NUMBNESS: 0
FREQUENCY: 0
COUGH: 0
INSOMNIA: 1
WHEEZING: 0
CONSTIPATION: 0
FEVER: 0
RHINORRHEA: 0
HEMATURIA: 0
CHILLS: 0
DIZZINESS: 0
VOMITING: 0
SORE THROAT: 0
HEADACHES: 0
TREMORS: 0
SHORTNESS OF BREATH: 0
DIARRHEA: 0
BACK PAIN: 1
DYSURIA: 0
WEAKNESS: 0

## 2024-03-27 ASSESSMENT — PATIENT HEALTH QUESTIONNAIRE - PHQ9
1. LITTLE INTEREST OR PLEASURE IN DOING THINGS: NOT AT ALL
2. FEELING DOWN, DEPRESSED OR HOPELESS: NOT AT ALL
SUM OF ALL RESPONSES TO PHQ9 QUESTIONS 1 AND 2: 0

## 2024-03-27 ASSESSMENT — LIFESTYLE VARIABLES: TOTAL SCORE: 0

## 2024-03-27 NOTE — PROGRESS NOTES
Subjective   Patient ID: Terry Lee is a 63 y.o. male who presents for Follow-up (Hypertension, Chronic Back Pain, and Chronic Insomnia).    Patient is here for follow-up on hypertension. Cardiac symptoms: none. He has no chest pain, dyspnea, exertional chest pressure/discomfort, near-syncope, orthopnea, palpitations, paroxysmal nocturnal dyspnea, and syncope. Use of agents associated with hypertension: none. History of target organ damage: none.    Back Pain  This is a chronic problem. The current episode started more than 1 year ago. The problem occurs constantly. The problem is unchanged. The pain is present in the lumbar spine. The pain is at a severity of 8/10. The pain is moderate. The pain is Worse during the day. The symptoms are aggravated by twisting, standing, bending and position. Pertinent negatives include no abdominal pain, chest pain, dysuria, fever, headaches, numbness, pelvic pain or weakness.   Insomnia  This is a chronic problem. The current episode started more than 1 year ago. The problem occurs constantly. The problem has been unchanged. Pertinent negatives include no abdominal pain, chest pain, chills, congestion, coughing, fever, headaches, numbness, sore throat, vomiting or weakness. Nothing aggravates the symptoms. He has tried nothing for the symptoms.      Review of Systems   Constitutional:  Negative for chills and fever.   HENT:  Negative for congestion, ear pain, nosebleeds, rhinorrhea and sore throat.    Respiratory:  Negative for cough, shortness of breath and wheezing.    Cardiovascular:  Negative for chest pain, palpitations and leg swelling.   Gastrointestinal:  Negative for abdominal pain, constipation, diarrhea and vomiting.   Genitourinary:  Negative for dysuria, frequency, hematuria and pelvic pain.   Musculoskeletal:  Positive for back pain.   Neurological:  Negative for dizziness, tremors, weakness, numbness and headaches.   Psychiatric/Behavioral:  The patient has  "insomnia.      Objective   /80   Pulse 65   Temp 36.1 °C (97 °F)   Resp 18   Ht 1.854 m (6' 1\")   Wt 134 kg (295 lb)   SpO2 97%   BMI 38.92 kg/m²     Physical Exam  Constitutional:       General: He is not in acute distress.     Appearance: Normal appearance.   HENT:      Head: Normocephalic and atraumatic.      Mouth/Throat:      Mouth: Mucous membranes are moist.      Pharynx: Oropharynx is clear. No oropharyngeal exudate or posterior oropharyngeal erythema.   Eyes:      General: No scleral icterus.     Extraocular Movements: Extraocular movements intact.      Pupils: Pupils are equal, round, and reactive to light.   Cardiovascular:      Rate and Rhythm: Normal rate and regular rhythm.      Pulses: Normal pulses.      Heart sounds: No murmur heard.     No friction rub. No gallop.   Pulmonary:      Effort: Pulmonary effort is normal.      Breath sounds: No wheezing, rhonchi or rales.   Skin:     General: Skin is warm.      Coloration: Skin is not jaundiced or pale.      Findings: No erythema or rash.   Neurological:      General: No focal deficit present.      Mental Status: He is alert and oriented to person, place, and time.      Cranial Nerves: No cranial nerve deficit.      Sensory: No sensory deficit.      Coordination: Coordination normal.      Gait: Gait normal.         Assessment/Plan   Problem List Items Addressed This Visit       Benign prostatic hyperplasia (BPH) with urinary urge incontinence     Well-controlled, continue current medications and management.         Chronic bilateral low back pain without sciatica     Stable, continue current medications and management.  Natural history and expected course discussed. Questions answered.  Educational material distributed.  Proper lifting, bending technique discussed.  Stretching exercises discussed.  Regular aerobic and trunk strengthening exercises discussed.  Ice to affected area as needed for local pain relief.  Heat to affected area as " "needed for local pain relief.         Relevant Medications    HYDROcodone-acetaminophen (Norco) 5-325 mg tablet    Other Relevant Orders    Follow Up In [facility] - PCP - Established    Chronic insomnia     Stable, continue current medications and management.         Relevant Medications    zolpidem (Ambien) 10 mg tablet    Other Relevant Orders    Follow Up In [facility] - PCP - Established    Class 2 severe obesity due to excess calories with serious comorbidity and body mass index (BMI) of 38.0 to 38.9 in adult (CMS/[facility])     Continue decrease calorie diet and not more than 1500 calorie per day diet and low-fat diet.  Continue with regular exercise program.  We advised exercise at least 5 days a week for at least 45 minutes and also a minimum of 10,000 steps a day.  The detrimental effects of obesity on health were discussed.         Essential hypertension - Primary     Well-controlled, continue current medications and management.  Dietary sodium restriction.  Regular aerobic exercise program is recommended to help achieve and maintain normal body weight, fitness and improve lipid balance. .  Dietary changes: Increase soluble fiber  Plant sterols 2grams per day (e.g. Benecol)  Reduce saturated fat, \"trans\" monounsaturated fatty acids, and cholesterol         Relevant Orders    Follow Up In [facility] - PCP - Established    Primary localized osteoarthrosis of multiple sites     Stable, continue current medications and management.         Relevant Orders    Follow Up In [facility] - PCP - Established     OARRS:  Griffin Sharma MD on 3/27/2024  9:20 AM  I have personally reviewed the OARRS report for Terry Lee. I have considered the risks of abuse, dependence, addiction and diversion    Is the patient prescribed a combination of a benzodiazepine and opioid?  No    Last Urine Drug Screen / ordered today: No  Recent Results (from the past 8760 hour(s))   POCT " waived urine drug screen manually resulted    Collection Time: 06/29/23  8:35 AM   Result Value Ref Range    POC THC Negative Negative, Not applicable ng/mL    POC Cocaine Negative Negative, Not applicable ng/mL    POC Opiates Negative Negative, Not applicable ng/mL    POC Amphetamine Negative Negative, Not applicable ng/mL    POC Phencyclidine (PCP) Negative Negative, Not applicable ng/mL    POC Barbiturates Negative Negative, Not applicable ng/mL    POC Benzodiazepines Negative Negative, Not applicable ng/mL    POC Methamphetamine Negative Negative, Not applicable ng/mL    POC METHADONE MANUALLY ENTERED Negative Negative, Not applicable ng/mL    POC Ticyclic Antidepressants (TCA) Negative Negative, Not applicable ng/mL    POC Oxycodone Negative Negative, Not applicable ng/mL    POC MDMA URINE Negative Negative, Not applicable ng/mL    POC Morphine Urine Negative Negative, Not applicable ng/mL    POC Burprenorphine Urine Negative Negative, Not applicable ng/mL   Zolpidem Confirmation, Urine    Collection Time: 06/29/23  8:34 AM   Result Value Ref Range    Zolpidem 46 (A) Cutoff <25 ng/mL    Zolpidem Metabolite (ZCA) >1000 (A) Cutoff <25 ng/mL     Controlled Substance Agreement:  Date of the Last Agreement: 3/27/2024  Reviewed Controlled Substance Agreement including but not limited to the benefits, risks, and alternatives to treatment with a Controlled Substance medication(s).    Opioids:  What is the patient's goal of therapy? Pain control and to be able to walk and perform activities of daily living without pain     Is this being achieved with current treatment? YES    I have calculated the patient's Morphine Dose Equivalent (MED):   I have considered referral to Pain Management and/or a specialist, and do not feel it is necessary at this time.    I feel that it is clinically indicated to continue this current medication regimen after consideration of alternative therapies, and other non-opioid  treatment.    Opioid Risk Screening:  Family History of Substance Abuse  Alcohol: 0 (3/27/2024  9:00 AM)  Illegal Drugs: 0 (3/27/2024  9:00 AM)  Prescription Drugs: 0 (3/27/2024  9:00 AM)    Personal History of Substance Abuse  Alcohol: 0 (3/27/2024  9:00 AM)  Illegal Drugs: 0 (3/27/2024  9:00 AM)  Prescription Drugs: 0 (3/27/2024  9:00 AM)    Patient Age (16-45)  Age (16-45): 0 (3/27/2024  9:00 AM)    History of Preadolescent Sexual Abuse  History of Preadolescent Sexual Abuse: .0 (3/27/2024  9:00 AM)    Psychological Disease  Attention Deficit Disorder, Obsessive Compulsive Disorder, Bipolar, Schizophrenia: 0 (3/27/2024  9:00 AM)  Depression: 0 (3/27/2024  9:00 AM)    Total Score  Total Score: 0 (3/27/2024  9:00 AM)    Total Score Risk Category  TOTAL SCORE CATEGORY: Low Risk (0-3) (3/27/2024  9:00 AM)    Pain Assessment:  Analgesia  What was your pain level on average during the past week?: 6  What was your pain level at its worst during the past week?: 8  What percentage of your pain has been relieved during the past week?: 10 %  Is the amount of pain relief you are now obtaining from your current pain relievers enough to make a real difference in your life?: Y  Query to Clinician: Is the patient's pain relief clinically significant?: Yes    Activities of Daily Living  Physical Functioning: Same  Family Relationships: Same  Social Relationships: Same  Mood: Same  Sleep Patterns: Same  Overall Functioning: Same    Adverse Events  Is patient experiencing any side effects from current pain relievers?: N  Patient's Overall Severity of Side Effects: None      Assessment  Is your overall impression that this patient is benefiting from opioid therapy?: Yes  Specific Analgesic Plan: Continue present regimen     and Sleep Aids:   What is the patient's goal of therapy? To be able to get uninterrupted sleep for 6-8 hours during the night without being unduly tired, dizzy or drowsy during the day    Is this being achieved  with current treatment? YES    Activities of Daily Living:   Is your overall impression that this patient is benefiting (symptom reduction outweighs side effects) from sleep aid therapy? Yes     1. Physical Functioning: Better  2. Family Relationship: Better  3. Social Relationship: Better  4. Mood: Better  5. Sleep Patterns: Better  6. Overall Function: Better    Scribe Attestation  By signing my name below, Jairon VERNON Scribe   attest that this documentation has been prepared under the direction and in the presence of Griffin Sharma MD.

## 2024-03-27 NOTE — PATIENT INSTRUCTIONS
BMI was above normal measurement. Current weight: 134 kg (295 lb)  Weight change since last visit (-) denotes wt loss 3.99 lbs   Weight loss needed to achieve BMI 25: 105.9 Lbs  Weight loss needed to achieve BMI 30: 68.1 Lbs  Advised to Increase physical activity.

## 2024-04-26 DIAGNOSIS — N40.1 BENIGN PROSTATIC HYPERPLASIA (BPH) WITH URINARY URGE INCONTINENCE: ICD-10-CM

## 2024-04-26 DIAGNOSIS — N39.41 BENIGN PROSTATIC HYPERPLASIA (BPH) WITH URINARY URGE INCONTINENCE: ICD-10-CM

## 2024-04-26 DIAGNOSIS — R39.11 URINARY HESITANCY: ICD-10-CM

## 2024-04-26 RX ORDER — FINASTERIDE 5 MG/1
5 TABLET, FILM COATED ORAL DAILY
Qty: 90 TABLET | Refills: 0 | Status: SHIPPED | OUTPATIENT
Start: 2024-04-26

## 2024-04-26 NOTE — TELEPHONE ENCOUNTER
Rx Refill Request Telephone Encounter    Name:  Malvinvince COSTA Lee  :  [id number]  Medication Name:  finasteride 5MG   Specific Pharmacy location:  [facility] in [facility]   Date of last appointment:  3/27/24  Date of next appointment:  24  Best number to reach patient:  [phone]

## 2024-04-30 ENCOUNTER — OFFICE VISIT (OUTPATIENT)
Dept: ORTHOPEDIC SURGERY | Facility: CLINIC | Age: 64
End: 2024-04-30
Payer: COMMERCIAL

## 2024-04-30 DIAGNOSIS — M65.4 DE QUERVAIN'S DISEASE (TENOSYNOVITIS): Primary | ICD-10-CM

## 2024-04-30 DIAGNOSIS — M62.40 INTRINSIC MUSCLE TIGHTNESS: ICD-10-CM

## 2024-04-30 PROCEDURE — 3008F BODY MASS INDEX DOCD: CPT | Performed by: ORTHOPAEDIC SURGERY

## 2024-04-30 PROCEDURE — 1036F TOBACCO NON-USER: CPT | Performed by: ORTHOPAEDIC SURGERY

## 2024-04-30 PROCEDURE — 99024 POSTOP FOLLOW-UP VISIT: CPT | Performed by: ORTHOPAEDIC SURGERY

## 2024-04-30 NOTE — PROGRESS NOTES
4/30/2024    Chief Complaint   Patient presents with    Left Wrist - Follow-up     Lt DeQuervain's release  DOS: 3/8/24       History of Present Illness:  Patient Terry Lee , 63 y.o. male, presents today, 4/30/2024, for evaluation of left wrist  de Quervain's release, 6 weeks postop.  Pain has resolved in the interim since last visit.  He describes some generalized bilateral hand pain and tightness.  In the left hand through the dorsal aspect of the wrist extending into the index finger and thumb patient has some intermittent sensation of tingling pins-and-needles, but this appears to be transient and decreasing over time. .         Review of Systems:   GENERAL: Negative  GI: Negative  MUSCULOSKELETAL: See HPI  SKIN: Negative  NEURO:  Negative     Physical Exam:  GENERAL:  Alert and oriented to person, place, and time.  No acute distress and breathing comfortably; pleasant and cooperative with the examination.  HEENT:  Head is normocephalic and atraumatic.  NECK:  Supple, no visible swelling.  CARDIOVASCULAR:  No palpable tachycardia.  LUNGS:  No audible wheezing or labored breathing.  ABDOMEN:  Nondistended.  Extremities: Evaluation of left upper extremity finds the patient to have a palpable radial artery at the wrist with brisk capillary refill to all digits. The patient has intact sensorium to axillary, radial, median and ulnar nerves. There are no open wounds. There are no signs of infection. There is no evidence of lymphedema or lymphatic streaking. The patient has supple compartments of the left arm, forearm and hand.  Surgical incision is well-healed.  He has full composite fist with no extensor lag.  Full range of motion through flexion extension pronosupination arc is equal and pain-free when compared bilaterally.      Imaging/Test Results:  None today.     Assessment:  #1: Left de Quervain's release, 6 weeks out with resolution of pain with some suspicion for transient irritation of dorsal  sensory branch of the radial nerve.  #2: Bilateral hand intrinsic tightness.     Plan:  Recommendations were made for activities and weight-bear to tolerance in the bilateral per extremities.  Given formal therapy referral for instruction on stretching program for intrinsic tightness.  Continue with activities to tolerance.  Follow-up with our office in as-needed basis.  All questions answered at today's visit.  We discussed that his transient symptoms of dorsal sensory branch of radial nerve irritation should continue to improve with time.    Anu Del Angel PA-C

## 2024-05-08 ENCOUNTER — APPOINTMENT (OUTPATIENT)
Dept: PRIMARY CARE | Facility: CLINIC | Age: 64
End: 2024-05-08
Payer: COMMERCIAL

## 2024-05-10 ENCOUNTER — APPOINTMENT (OUTPATIENT)
Dept: PRIMARY CARE | Facility: CLINIC | Age: 64
End: 2024-05-10
Payer: COMMERCIAL

## 2024-05-10 ENCOUNTER — TELEPHONE (OUTPATIENT)
Dept: PRIMARY CARE | Facility: CLINIC | Age: 64
End: 2024-05-10

## 2024-05-10 DIAGNOSIS — J01.00 ACUTE NON-RECURRENT MAXILLARY SINUSITIS: ICD-10-CM

## 2024-05-10 RX ORDER — AMOXICILLIN AND CLAVULANATE POTASSIUM 875; 125 MG/1; MG/1
875 TABLET, FILM COATED ORAL 2 TIMES DAILY
Qty: 14 TABLET | Refills: 0 | Status: SHIPPED | OUTPATIENT
Start: 2024-05-10 | End: 2024-05-17

## 2024-05-10 RX ORDER — PROMETHAZINE HYDROCHLORIDE AND DEXTROMETHORPHAN HYDROBROMIDE 6.25; 15 MG/5ML; MG/5ML
5 SYRUP ORAL EVERY 6 HOURS PRN
Qty: 180 ML | Refills: 0 | Status: SHIPPED | OUTPATIENT
Start: 2024-05-10 | End: 2024-06-09

## 2024-05-10 NOTE — TELEPHONE ENCOUNTER
Per Dr. Sharma speak with the patient for more information, if allergies inform him to take Allegra and Flonase OTC, if sinus infection symptoms find them out and let him know.    Per patient uncontrollable cough, congestion and runny nose. He denies SOB or wheezing.

## 2024-05-10 NOTE — TELEPHONE ENCOUNTER
Patient called in stating he has severe allergies. He stated he receives an antibiotic for this and is wondering if Dr. Sharma could send something in for him?  [facility] in [place]  Please Advise

## 2024-05-10 NOTE — TELEPHONE ENCOUNTER
Per Dr. Sharma pend Augmentin 875mg BID for 7 days #14 with no refill    Rx pended   zpak is not indicated for sinus infections, it does not have good penetration to the sinuses.   This could just be COVID symptoms causing these symptoms. I have given you an antibiotic but it may not hep if this is just due to COVID.   ·   ·   ·   ·   · Mucinex, which is an expectorant which means it thins the mucus/phlegm so one can blow, cough or spit mucus/phlegm out better.  · Flonase nasal spray 1-2 sprays in each nostril daily for nasal congestion.  · Can use Coricidin as directed on package if sinus pressure is present, she is on numerous medications for HTN.  · Push Fluids. Warm tea with honey.  · Tylenol every 4 hours as needed for fever or aches (Do not exceed 4,000mg in 24 hours)  · Cool mist Humidifier.   · Cover coughs or sneezes   · Practice good hand hygiene  · Practice good social distancing: STAY HOME!  · Recommended warm moist compresses to sinuses 4-6 times daily  · Normal saline rinses two-three times daily.             Sinusitis (Antibiotic Treatment)    The sinuses are air-filled spaces within the bones of the face. They connect to the inside of the nose. Sinusitis is an inflammation of the tissue that lines the sinuses. Sinusitis can occur during a cold. It can also happen due to allergies to pollens and other particles in the air. Sinusitis can cause symptoms of sinus congestion and a feeling of fullness. A sinus infection causes fever, headache, and facial pain. There is often green or yellow fluid draining from the nose or into the back of the throat (post-nasal drip). You have been given antibiotics to treat this condition.  Home care  · Take the full course of antibiotics as instructed. Do not stop taking them, even when you feel better.  · Drink plenty of water, hot tea, and other liquids. This may help thin nasal mucus. It also may help your sinuses drain fluids.  · Heat may help soothe painful areas of your face. Use a towel soaked in hot water. Or,  the shower and direct the  warm spray onto your face. Using a vaporizer along with a menthol rub at night may also help soothe symptoms.   · An expectorant with guaifenesin may help thin nasal mucus and help your sinuses drain fluids.  · You can use an over-the-counter decongestant, unless a similar medicine was prescribed to you. Nasal sprays work the fastest. Use one that contains phenylephrine or oxymetazoline. First blow your nose gently. Then use the spray. Do not use these medicines more often than directed on the label. If you do, your symptoms may get worse. You may also take pills that contain pseudoephedrine. Don’t use products that combine multiple medicines. This is because side effects may be increased. Read labels. You can also ask the pharmacist for help. (People with high blood pressure should not use decongestants. They can raise blood pressure.)  · Over-the-counter antihistamines may help if allergies contributed to your sinusitis.    · Do not use nasal rinses or irrigation during an acute sinus infection, unless your healthcare provider tells you to. Rinsing may spread the infection to other areas in your sinuses.  · Use acetaminophen or ibuprofen to control pain, unless another pain medicine was prescribed to you. If you have chronic liver or kidney disease or ever had a stomach ulcer, talk with your healthcare provider before using these medicines. (Aspirin should never be taken by anyone under age 18 who is ill with a fever. It may cause severe liver damage.)  · Don't smoke. This can make symptoms worse.  Follow-up care  Follow up with your healthcare provider or our staff if you are better in 1 week.  When to seek medical advice  Call your healthcare provider if any of these occur:  · Facial pain or headache that gets worse  · Stiff neck  · Unusual drowsiness or confusion  · Swelling of your forehead or eyelids  · Vision problems, such as blurred or double vision  · Fever of 100.4ºF (38ºC) or higher, or as directed by  your healthcare provider  · Seizure  · Breathing problems  · Symptoms don't go away in 10 days  Prevention  Here are steps you can take to help prevent an infection:  · Keep good hand washing habits.  · Don’t have close contact with people who have sore throats, colds, or other upper respiratory infections.  · Don’t smoke, and stay away from secondhand smoke.  · Stay up to date with of your vaccines.  Date Last Reviewed: 11/1/2017  © 9683-4070 [employer]. [address] [place] [address]. All rights reserved. This information is not intended as a substitute for professional medical care. Always follow your healthcare professional's instructions.

## 2024-05-13 ENCOUNTER — APPOINTMENT (OUTPATIENT)
Dept: ORTHOPEDIC SURGERY | Facility: CLINIC | Age: 64
End: 2024-05-13
Payer: COMMERCIAL

## 2024-06-20 ENCOUNTER — APPOINTMENT (OUTPATIENT)
Dept: PRIMARY CARE | Facility: CLINIC | Age: 64
End: 2024-06-20
Payer: COMMERCIAL

## 2024-06-20 VITALS
BODY MASS INDEX: 38.57 KG/M2 | SYSTOLIC BLOOD PRESSURE: 118 MMHG | HEIGHT: 73 IN | DIASTOLIC BLOOD PRESSURE: 82 MMHG | OXYGEN SATURATION: 94 % | RESPIRATION RATE: 18 BRPM | HEART RATE: 59 BPM | WEIGHT: 291 LBS | TEMPERATURE: 97.3 F

## 2024-06-20 DIAGNOSIS — Z79.899 MEDICATION MANAGEMENT: ICD-10-CM

## 2024-06-20 DIAGNOSIS — E66.01 CLASS 2 SEVERE OBESITY DUE TO EXCESS CALORIES WITH SERIOUS COMORBIDITY AND BODY MASS INDEX (BMI) OF 38.0 TO 38.9 IN ADULT (MULTI): ICD-10-CM

## 2024-06-20 DIAGNOSIS — I10 ESSENTIAL HYPERTENSION: Primary | ICD-10-CM

## 2024-06-20 DIAGNOSIS — G89.29 CHRONIC BILATERAL LOW BACK PAIN WITHOUT SCIATICA: ICD-10-CM

## 2024-06-20 DIAGNOSIS — K21.9 GASTROESOPHAGEAL REFLUX DISEASE WITHOUT ESOPHAGITIS: ICD-10-CM

## 2024-06-20 DIAGNOSIS — M54.50 CHRONIC BILATERAL LOW BACK PAIN WITHOUT SCIATICA: ICD-10-CM

## 2024-06-20 DIAGNOSIS — J30.9 CHRONIC ALLERGIC RHINITIS: ICD-10-CM

## 2024-06-20 DIAGNOSIS — M19.91 PRIMARY LOCALIZED OSTEOARTHROSIS OF MULTIPLE SITES: ICD-10-CM

## 2024-06-20 DIAGNOSIS — N39.41 BENIGN PROSTATIC HYPERPLASIA (BPH) WITH URINARY URGE INCONTINENCE: ICD-10-CM

## 2024-06-20 DIAGNOSIS — N40.1 BENIGN PROSTATIC HYPERPLASIA (BPH) WITH URINARY URGE INCONTINENCE: ICD-10-CM

## 2024-06-20 DIAGNOSIS — J01.01 ACUTE RECURRENT MAXILLARY SINUSITIS: ICD-10-CM

## 2024-06-20 DIAGNOSIS — F51.04 CHRONIC INSOMNIA: ICD-10-CM

## 2024-06-20 PROCEDURE — 80305 DRUG TEST PRSMV DIR OPT OBS: CPT | Performed by: FAMILY MEDICINE

## 2024-06-20 PROCEDURE — 3074F SYST BP LT 130 MM HG: CPT | Performed by: FAMILY MEDICINE

## 2024-06-20 PROCEDURE — 99214 OFFICE O/P EST MOD 30 MIN: CPT | Performed by: FAMILY MEDICINE

## 2024-06-20 PROCEDURE — 3008F BODY MASS INDEX DOCD: CPT | Performed by: FAMILY MEDICINE

## 2024-06-20 PROCEDURE — 80368 SEDATIVE HYPNOTICS: CPT

## 2024-06-20 PROCEDURE — 1036F TOBACCO NON-USER: CPT | Performed by: FAMILY MEDICINE

## 2024-06-20 PROCEDURE — 3079F DIAST BP 80-89 MM HG: CPT | Performed by: FAMILY MEDICINE

## 2024-06-20 RX ORDER — CEFUROXIME AXETIL 250 MG/1
250 TABLET ORAL 2 TIMES DAILY
Qty: 20 TABLET | Refills: 0 | Status: SHIPPED | OUTPATIENT
Start: 2024-06-20 | End: 2024-06-30

## 2024-06-20 RX ORDER — HYDROCODONE BITARTRATE AND ACETAMINOPHEN 5; 325 MG/1; MG/1
1 TABLET ORAL EVERY 6 HOURS PRN
Qty: 100 TABLET | Refills: 0 | Status: SHIPPED | OUTPATIENT
Start: 2024-06-20

## 2024-06-20 RX ORDER — TAMSULOSIN HYDROCHLORIDE 0.4 MG/1
0.4 CAPSULE ORAL DAILY
Qty: 90 CAPSULE | Refills: 1 | Status: SHIPPED | OUTPATIENT
Start: 2024-06-20

## 2024-06-20 RX ORDER — ZOLPIDEM TARTRATE 10 MG/1
10 TABLET ORAL NIGHTLY PRN
Qty: 30 TABLET | Refills: 2 | Status: SHIPPED | OUTPATIENT
Start: 2024-06-20

## 2024-06-20 RX ORDER — FINASTERIDE 5 MG/1
5 TABLET, FILM COATED ORAL DAILY
Qty: 90 TABLET | Refills: 0 | Status: SHIPPED | OUTPATIENT
Start: 2024-06-20

## 2024-06-20 RX ORDER — VALSARTAN AND HYDROCHLOROTHIAZIDE 160; 12.5 MG/1; MG/1
1 TABLET, FILM COATED ORAL DAILY
Qty: 90 TABLET | Refills: 1 | Status: SHIPPED | OUTPATIENT
Start: 2024-06-20

## 2024-06-20 RX ORDER — FAMOTIDINE 20 MG/1
20 TABLET, FILM COATED ORAL DAILY
Qty: 90 TABLET | Refills: 3 | Status: SHIPPED | OUTPATIENT
Start: 2024-06-20

## 2024-06-20 ASSESSMENT — ENCOUNTER SYMPTOMS
CHILLS: 0
SORE THROAT: 0
TREMORS: 0
HEADACHES: 0
DIZZINESS: 0
FEVER: 0
CONSTIPATION: 0
WHEEZING: 0
VOMITING: 0
INSOMNIA: 1
SHORTNESS OF BREATH: 0
BACK PAIN: 1
WEAKNESS: 0
NUMBNESS: 0
HEMATURIA: 0
RHINORRHEA: 0
COUGH: 1
DYSURIA: 0
LEG PAIN: 0
FREQUENCY: 0
PALPITATIONS: 0
ABDOMINAL PAIN: 0
DIARRHEA: 0

## 2024-06-20 ASSESSMENT — PATIENT HEALTH QUESTIONNAIRE - PHQ9
2. FEELING DOWN, DEPRESSED OR HOPELESS: NOT AT ALL
1. LITTLE INTEREST OR PLEASURE IN DOING THINGS: NOT AT ALL
SUM OF ALL RESPONSES TO PHQ9 QUESTIONS 1 AND 2: 0

## 2024-06-20 NOTE — PROGRESS NOTES
Subjective   Patient ID: Terry Lee is a 63 y.o. male who presents for Follow-up (Hypertension, Chronic Back Pain and Chronic Insomnia) and Cough.    Patient is here for follow-up on hypertension. Cardiac symptoms: none. He has no chest pain, dyspnea, exertional chest pressure/discomfort, near-syncope, orthopnea, palpitations, paroxysmal nocturnal dyspnea, and syncope. Use of agents associated with hypertension: none. History of target organ damage: none.    Back Pain  This is a chronic problem. The current episode started more than 1 year ago. The problem occurs constantly. The problem is unchanged. The pain is present in the lumbar spine. The pain does not radiate. The pain is at a severity of 0/10. The patient is experiencing no pain. Pertinent negatives include no abdominal pain, chest pain, dysuria, fever, headaches, leg pain, numbness or weakness. He has tried nothing for the symptoms. The treatment provided significant relief.   Insomnia  This is a chronic problem. The current episode started more than 1 year ago. The problem occurs intermittently. The problem has been waxing and waning. Associated symptoms include coughing. Pertinent negatives include no abdominal pain, chest pain, chills, congestion, fever, headaches, numbness, sore throat, vomiting or weakness. Nothing aggravates the symptoms. He has tried oral narcotics for the symptoms. The treatment provided significant relief.   Cough  This is a recurrent problem. The current episode started more than 1 month ago. The problem has been waxing and waning. The problem occurs constantly. The cough is Productive of sputum. Associated symptoms include nasal congestion and postnasal drip. Pertinent negatives include no chest pain, chills, ear pain, fever, headaches, rhinorrhea, sore throat, shortness of breath or wheezing. The symptoms are aggravated by pollens. He has tried oral steroids and prescription cough suppressant for the symptoms. The  "treatment provided no relief. His past medical history is significant for environmental allergies.        Review of Systems   Constitutional:  Negative for chills and fever.   HENT:  Positive for postnasal drip. Negative for congestion, ear pain, nosebleeds, rhinorrhea and sore throat.    Respiratory:  Positive for cough. Negative for shortness of breath and wheezing.    Cardiovascular:  Negative for chest pain, palpitations and leg swelling.   Gastrointestinal:  Negative for abdominal pain, constipation, diarrhea and vomiting.   Genitourinary:  Negative for dysuria, frequency and hematuria.   Musculoskeletal:  Positive for back pain.   Allergic/Immunologic: Positive for environmental allergies.   Neurological:  Negative for dizziness, tremors, weakness, numbness and headaches.   Psychiatric/Behavioral:  The patient has insomnia.        Objective   /82   Pulse 59   Temp 36.3 °C (97.3 °F)   Resp 18   Ht 1.854 m (6' 1\")   Wt 132 kg (291 lb)   SpO2 94%   BMI 38.39 kg/m²     Physical Exam  Constitutional:       General: He is not in acute distress.     Appearance: Normal appearance.   HENT:      Head: Normocephalic and atraumatic.      Mouth/Throat:      Mouth: Mucous membranes are moist.      Pharynx: Oropharynx is clear. No oropharyngeal exudate or posterior oropharyngeal erythema.   Eyes:      General: No scleral icterus.     Extraocular Movements: Extraocular movements intact.      Pupils: Pupils are equal, round, and reactive to light.   Cardiovascular:      Rate and Rhythm: Normal rate and regular rhythm.      Pulses: Normal pulses.      Heart sounds: No murmur heard.     No friction rub. No gallop.   Pulmonary:      Effort: Pulmonary effort is normal.      Breath sounds: No wheezing, rhonchi or rales.   Skin:     General: Skin is warm.      Coloration: Skin is not jaundiced or pale.      Findings: No erythema or rash.   Neurological:      General: No focal deficit present.      Mental Status: He is " alert and oriented to person, place, and time.      Cranial Nerves: No cranial nerve deficit.      Sensory: No sensory deficit.      Coordination: Coordination normal.      Gait: Gait normal.       [facility] Encounter on 09/08/2023   Component Date Value Ref Range Status    Pathology Report 09/08/2023    Final                    Value:                                                MRN: [id number]  Patient Name PHILLIP HUDSON  Accession #: [id number]  Date of Procedure:  9/8/2023       Date Reported: 9/11/2023  Date Received:  9/8/2023  Date of Birth / Sex 1960 (Age: 62) / M  Race: WHITE  Submitting Physician: DEVON GRAY M.D.    Other External #          FINAL CYTOLOGICAL INTERPRETATION    A.  URINE CLEAN CATCH:  --NO MALIGNANT CELLS IDENTIFIED.      Slide(s) initially screened by a Cytotechnologist at [place] [address]      Electronically Signed Out By Bhupinder Ceron MD    By the signature on this report, the individual or group listed as making the  Final Interpretation/Diagnosis certifies that they have reviewed this case.  Slide(s) initially screened by a Cytotechnologist at [facility]  Diagnostic interpretation performed at [address]. [place] [address]       Clinical History                                Hematuria - (R31.9)   Source of Specimen  A: URINE CLEAN CATCH     Specimen Submitted as:  A:  URINE CLEAN CATCH        Pap non-gyn ThinPrep slide    Gross Description  A.  URINE CLEAN CATCH:  RECEIVED 40cc OF STRAW COLORED, CLEAR FLUID.                      [facility]  Department of Pathology  [address] [place] [address]        CONVERTED FINAL DIAGNOSIS 09/08/2023    Final                    Value:A.  URINE CLEAN CATCH:  --NO MALIGNANT CELLS IDENTIFIED.      CONVERTED CLINICAL DIAGNOSIS-HISTO* 09/08/2023 Hematuria - (R31.9)   Final     CONVERTED DIAGNOSIS COMMENT 09/08/2023    Final                    Value:Slide(s) initially screened by a Cytotechnologist at [place] [address]        CONVERTED SPECIMEN DESCRIPTION 09/08/2023    Final                    Value:A.  URINE CLEAN CATCH:  RECEIVED 40cc OF STRAW COLORED, CLEAR FLUID.             CONVERTED FINAL REPORT PDF LINK TO* 09/08/2023 \\copathshare\[email or web address]\PDF 2022_[id number]   Final      Assessment/Plan   Problem List Items Addressed This Visit       Acute recurrent maxillary sinusitis     Use OTC Flonase Nasal Spray.  Recommend liberal oral fluid intake.  Call if symptoms fail to improve as expected.    Follow-up if persistent or worsening symptoms otherwise when necessary.         Relevant Medications    cefuroxime (Ceftin) 250 mg tablet    Benign prostatic hyperplasia (BPH) with urinary urge incontinence     Well-controlled, continue current medications and management.         Relevant Medications    tamsulosin (Flomax) 0.4 mg 24 hr capsule    finasteride (Proscar) 5 mg tablet    Other Relevant Orders    Follow Up In [facility] - PCP - Established    Chronic allergic rhinitis     Start taking OTC Zyrtec 10 mg daily.         Chronic bilateral low back pain without sciatica     Stable, continue current medications and management.  Natural history and expected course discussed. Questions answered.  Educational material distributed.  Proper lifting, bending technique discussed.  Stretching exercises discussed.  Regular aerobic and trunk strengthening exercises discussed.  Ice to affected area as needed for local pain relief.  Heat to affected area as needed for local pain relief.         Relevant Medications    HYDROcodone-acetaminophen (Norco) 5-325 mg tablet    Other Relevant Orders    POCT waived urine drug screen manually resulted (Completed)    Follow Up In [facility] - PCP - Established  "   Chronic insomnia     Stable, continue current medications and management.         Relevant Medications    zolpidem (Ambien) 10 mg tablet    Other Relevant Orders    POCT waived urine drug screen manually resulted (Completed)    Zolpidem Confirmation, Urine    Follow Up In [facility] - PCP - Established    OOB Internal Tracking    Class 2 severe obesity due to excess calories with serious comorbidity and body mass index (BMI) of 38.0 to 38.9 in adult (Multi)     Continue decrease calorie diet and not more than 1500 calorie per day diet and low-fat diet.  Continue with regular exercise program.  We advised exercise at least 5 days a week for at least 45 minutes and also a minimum of 10,000 steps a day.  The detrimental effects of obesity on health were discussed.         Essential hypertension - Primary     Well-controlled, continue current medications and management.  Dietary sodium restriction.  Regular aerobic exercise program is recommended to help achieve and maintain normal body weight, fitness and improve lipid balance. .  Dietary changes: Increase soluble fiber  Plant sterols 2grams per day (e.g. Benecol)  Reduce saturated fat, \"trans\" monounsaturated fatty acids, and cholesterol         Relevant Medications    valsartan-hydrochlorothiazide (Diovan-HCT) 160-12.5 mg tablet    Other Relevant Orders    Follow Up In [facility] - PCP - Established    Gastroesophageal reflux disease without esophagitis     Well-controlled, continue current medications and management.         Relevant Medications    famotidine (Pepcid) 20 mg tablet    Primary localized osteoarthrosis of multiple sites     Stable, continue current medications and management.         Relevant Orders    Follow Up In [facility] - PCP - Established     Other Visit Diagnoses       Medication management        Relevant Orders    POCT waived urine drug screen manually resulted (Completed)    Zolpidem Confirmation, Urine    " OOB Internal Tracking          OARRS:  Griffin Sharma MD on 6/20/2024 12:06 PM  I have personally reviewed the OARRS report for Terry Lee. I have considered the risks of abuse, dependence, addiction and diversion    Is the patient prescribed a combination of a benzodiazepine and opioid?  No    Last Urine Drug Screen / ordered today: Yes  Recent Results (from the past 8760 hour(s))   POCT waived urine drug screen manually resulted    Collection Time: 06/20/24 12:19 PM   Result Value Ref Range    POC THC Negative Negative, Not applicable ng/mL    POC Cocaine Negative Negative, Not applicable ng/mL    POC Opiates Negative Negative, Not applicable ng/mL    POC Amphetamine Negative Negative, Not applicable ng/mL    POC Phencyclidine (PCP) Negative Negative, Not applicable ng/mL    POC Barbiturates Negative Negative, Not applicable ng/mL    POC Benzodiazepines Negative Negative, Not applicable ng/mL    POC Methamphetamine Negative Negative, Not applicable ng/mL    POC METHADONE MANUALLY ENTERED Negative Negative, Not applicable ng/mL    POC Ticyclic Antidepressants (TCA) Negative Negative, Not applicable ng/mL    POC Oxycodone Negative Negative, Not applicable ng/mL    POC MDMA URINE Negative Negative, Not applicable ng/mL    POC Morphine Urine Negative Negative, Not applicable ng/mL    POC Burprenorphine Urine Negative Negative, Not applicable ng/mL   Zolpidem Confirmation, Urine    Collection Time: 06/29/23  8:34 AM   Result Value Ref Range    Zolpidem 46 (A) Cutoff <25 ng/mL    Zolpidem Metabolite (ZCA) >1000 (A) Cutoff <25 ng/mL     Controlled Substance Agreement:  Date of the Last Agreement: 3/27/2024  Reviewed Controlled Substance Agreement including but not limited to the benefits, risks, and alternatives to treatment with a Controlled Substance medication(s).    Opioids:  What is the patient's goal of therapy? Pain control and to be able to walk and perform activities of daily living without pain     Is  this being achieved with current treatment? yes    I have calculated the patient's Morphine Dose Equivalent (MED):   I have considered referral to Pain Management and/or a specialist, and do not feel it is necessary at this time.    I feel that it is clinically indicated to continue this current medication regimen after consideration of alternative therapies, and other non-opioid treatment.    Opioid Risk Screening:  Family History of Substance Abuse  Alcohol: 0 (3/27/2024  9:00 AM)  Illegal Drugs: 0 (3/27/2024  9:00 AM)  Prescription Drugs: 0 (3/27/2024  9:00 AM)    Personal History of Substance Abuse  Alcohol: 0 (3/27/2024  9:00 AM)  Illegal Drugs: 0 (3/27/2024  9:00 AM)  Prescription Drugs: 0 (3/27/2024  9:00 AM)    Patient Age (16-45)  Age (16-45): 0 (3/27/2024  9:00 AM)    History of Preadolescent Sexual Abuse  History of Preadolescent Sexual Abuse: .0 (3/27/2024  9:00 AM)    Psychological Disease  Attention Deficit Disorder, Obsessive Compulsive Disorder, Bipolar, Schizophrenia: 0 (3/27/2024  9:00 AM)  Depression: 0 (3/27/2024  9:00 AM)    Total Score  Total Score: 0 (3/27/2024  9:00 AM)    Total Score Risk Category  TOTAL SCORE CATEGORY: Low Risk (0-3) (3/27/2024  9:00 AM)        Pain Assessment:  Analgesia  What was your pain level on average during the past week?: 6  What was your pain level at its worst during the past week?: 8  What percentage of your pain has been relieved during the past week?: 50 %  Is the amount of pain relief you are now obtaining from your current pain relievers enough to make a real difference in your life?: Y  Query to Clinician: Is the patient's pain relief clinically significant?: Yes    Activities of Daily Living  Physical Functioning: Better  Family Relationships: Better  Social Relationships: Better  Mood: Better  Sleep Patterns: Better  Overall Functioning: Better    Adverse Events  Is patient experiencing any side effects from current pain relievers?: N  Patient's Overall  Severity of Side Effects: None      Assessment  Is your overall impression that this patient is benefiting from opioid therapy?: Yes  Specific Analgesic Plan: Continue present regimen    and Sleep Aids:   What is the patient's goal of therapy? To be able to get uninterrupted sleep for 6-8 hours during the night without being unduly tired, dizzy or drowsy during the day    Is this being achieved with current treatment? yes    Activities of Daily Living:   Is your overall impression that this patient is benefiting (symptom reduction outweighs side effects) from sleep aid therapy? Yes     1. Physical Functioning: Better  2. Family Relationship: Better  3. Social Relationship: Better  4. Mood: Better  5. Sleep Patterns: Better  6. Overall Function: Better    Scribe Attestation  By signing my name below, Jairon VERNON Scribe   attest that this documentation has been prepared under the direction and in the presence of Griffin Sharma MD.

## 2024-06-20 NOTE — PATIENT INSTRUCTIONS
BMI was above normal measurement. Current weight: 132 kg (291 lb)  Weight change since last visit (-) denotes wt loss -4 lbs   Weight loss needed to achieve BMI 25: 101.9 Lbs  Weight loss needed to achieve BMI 30: 64.1 Lbs  Advised to Increase physical activity.

## 2024-06-20 NOTE — ASSESSMENT & PLAN NOTE
Use OTC Flonase Nasal Spray.  Recommend liberal oral fluid intake.  Call if symptoms fail to improve as expected.    Follow-up if persistent or worsening symptoms otherwise when necessary.

## 2024-06-26 LAB
ZOLPIDEM UR CFM-MCNC: >1000 NG/ML
ZOLPIDEM UR-MCNC: 44 NG/ML

## 2024-08-29 DIAGNOSIS — N40.1 BENIGN PROSTATIC HYPERPLASIA (BPH) WITH URINARY URGE INCONTINENCE: ICD-10-CM

## 2024-08-29 DIAGNOSIS — N39.41 BENIGN PROSTATIC HYPERPLASIA (BPH) WITH URINARY URGE INCONTINENCE: ICD-10-CM

## 2024-08-29 RX ORDER — FINASTERIDE 5 MG/1
5 TABLET, FILM COATED ORAL DAILY
Qty: 90 TABLET | Refills: 3 | Status: SHIPPED | OUTPATIENT
Start: 2024-08-29

## 2024-08-29 NOTE — TELEPHONE ENCOUNTER
Rx Refill Request Telephone Encounter    Name:  Terry Lee  :  [id number]    Specific Pharmacy location:  [facility]   Date of last appointment:  24  Date of next appointment:  24

## 2024-09-10 DIAGNOSIS — Z12.5 ENCOUNTER FOR SCREENING FOR MALIGNANT NEOPLASM OF PROSTATE: ICD-10-CM

## 2024-09-10 DIAGNOSIS — I10 ESSENTIAL HYPERTENSION: ICD-10-CM

## 2024-09-23 ENCOUNTER — LAB (OUTPATIENT)
Dept: LAB | Facility: LAB | Age: 64
End: 2024-09-23
Payer: COMMERCIAL

## 2024-09-23 DIAGNOSIS — Z12.5 ENCOUNTER FOR SCREENING FOR MALIGNANT NEOPLASM OF PROSTATE: ICD-10-CM

## 2024-09-23 DIAGNOSIS — I10 ESSENTIAL HYPERTENSION: ICD-10-CM

## 2024-09-23 LAB
ALBUMIN SERPL BCP-MCNC: 4.2 G/DL (ref 3.4–5)
ALP SERPL-CCNC: 57 U/L (ref 33–136)
ALT SERPL W P-5'-P-CCNC: 22 U/L (ref 10–52)
ANION GAP SERPL CALC-SCNC: 13 MMOL/L (ref 10–20)
AST SERPL W P-5'-P-CCNC: 21 U/L (ref 9–39)
BASOPHILS # BLD AUTO: 0.03 X10*3/UL (ref 0–0.1)
BASOPHILS NFR BLD AUTO: 0.5 %
BILIRUB SERPL-MCNC: 0.8 MG/DL (ref 0–1.2)
BUN SERPL-MCNC: 18 MG/DL (ref 6–23)
CALCIUM SERPL-MCNC: 9.2 MG/DL (ref 8.6–10.3)
CHLORIDE SERPL-SCNC: 100 MMOL/L (ref 98–107)
CHOLEST SERPL-MCNC: 230 MG/DL (ref 0–199)
CHOLESTEROL/HDL RATIO: 2.2
CO2 SERPL-SCNC: 33 MMOL/L (ref 21–32)
CREAT SERPL-MCNC: 0.78 MG/DL (ref 0.5–1.3)
EGFRCR SERPLBLD CKD-EPI 2021: >90 ML/MIN/1.73M*2
EOSINOPHIL # BLD AUTO: 0.05 X10*3/UL (ref 0–0.7)
EOSINOPHIL NFR BLD AUTO: 0.9 %
ERYTHROCYTE [DISTWIDTH] IN BLOOD BY AUTOMATED COUNT: 14.6 % (ref 11.5–14.5)
GLUCOSE SERPL-MCNC: 96 MG/DL (ref 74–99)
HCT VFR BLD AUTO: 43.5 % (ref 41–52)
HDLC SERPL-MCNC: 102.3 MG/DL
HGB BLD-MCNC: 14.1 G/DL (ref 13.5–17.5)
IMM GRANULOCYTES # BLD AUTO: 0.01 X10*3/UL (ref 0–0.7)
IMM GRANULOCYTES NFR BLD AUTO: 0.2 % (ref 0–0.9)
LDLC SERPL CALC-MCNC: 110 MG/DL
LYMPHOCYTES # BLD AUTO: 1.54 X10*3/UL (ref 1.2–4.8)
LYMPHOCYTES NFR BLD AUTO: 27.3 %
MCH RBC QN AUTO: 29.3 PG (ref 26–34)
MCHC RBC AUTO-ENTMCNC: 32.4 G/DL (ref 32–36)
MCV RBC AUTO: 90 FL (ref 80–100)
MONOCYTES # BLD AUTO: 0.58 X10*3/UL (ref 0.1–1)
MONOCYTES NFR BLD AUTO: 10.3 %
NEUTROPHILS # BLD AUTO: 3.44 X10*3/UL (ref 1.2–7.7)
NEUTROPHILS NFR BLD AUTO: 60.8 %
NON HDL CHOLESTEROL: 128 MG/DL (ref 0–149)
NRBC BLD-RTO: 0 /100 WBCS (ref 0–0)
PLATELET # BLD AUTO: 189 X10*3/UL (ref 150–450)
POTASSIUM SERPL-SCNC: 4.9 MMOL/L (ref 3.5–5.3)
PROT SERPL-MCNC: 7 G/DL (ref 6.4–8.2)
PSA SERPL-MCNC: 1.49 NG/ML
RBC # BLD AUTO: 4.81 X10*6/UL (ref 4.5–5.9)
SODIUM SERPL-SCNC: 141 MMOL/L (ref 136–145)
TRIGL SERPL-MCNC: 89 MG/DL (ref 0–149)
VLDL: 18 MG/DL (ref 0–40)
WBC # BLD AUTO: 5.7 X10*3/UL (ref 4.4–11.3)

## 2024-09-23 PROCEDURE — 36415 COLL VENOUS BLD VENIPUNCTURE: CPT

## 2024-09-23 PROCEDURE — 85025 COMPLETE CBC W/AUTO DIFF WBC: CPT

## 2024-09-23 PROCEDURE — 84153 ASSAY OF PSA TOTAL: CPT

## 2024-09-23 PROCEDURE — 80053 COMPREHEN METABOLIC PANEL: CPT

## 2024-09-23 PROCEDURE — 80061 LIPID PANEL: CPT

## 2024-09-24 ENCOUNTER — APPOINTMENT (OUTPATIENT)
Dept: PRIMARY CARE | Facility: CLINIC | Age: 64
End: 2024-09-24
Payer: COMMERCIAL

## 2024-09-24 VITALS
SYSTOLIC BLOOD PRESSURE: 132 MMHG | OXYGEN SATURATION: 96 % | BODY MASS INDEX: 38.43 KG/M2 | HEIGHT: 73 IN | WEIGHT: 290 LBS | RESPIRATION RATE: 16 BRPM | DIASTOLIC BLOOD PRESSURE: 80 MMHG | TEMPERATURE: 97.2 F | HEART RATE: 46 BPM

## 2024-09-24 DIAGNOSIS — I10 ESSENTIAL HYPERTENSION: ICD-10-CM

## 2024-09-24 DIAGNOSIS — R97.20 ELEVATED PSA: ICD-10-CM

## 2024-09-24 DIAGNOSIS — N52.9 ERECTILE DYSFUNCTION, UNSPECIFIED ERECTILE DYSFUNCTION TYPE: ICD-10-CM

## 2024-09-24 DIAGNOSIS — M19.91 PRIMARY LOCALIZED OSTEOARTHROSIS OF MULTIPLE SITES: ICD-10-CM

## 2024-09-24 DIAGNOSIS — E66.01 CLASS 2 SEVERE OBESITY DUE TO EXCESS CALORIES WITH SERIOUS COMORBIDITY AND BODY MASS INDEX (BMI) OF 38.0 TO 38.9 IN ADULT: ICD-10-CM

## 2024-09-24 DIAGNOSIS — N39.41 BENIGN PROSTATIC HYPERPLASIA (BPH) WITH URINARY URGE INCONTINENCE: ICD-10-CM

## 2024-09-24 DIAGNOSIS — G89.29 CHRONIC BILATERAL LOW BACK PAIN WITHOUT SCIATICA: ICD-10-CM

## 2024-09-24 DIAGNOSIS — B35.1 ONYCHOMYCOSIS OF TOENAIL: ICD-10-CM

## 2024-09-24 DIAGNOSIS — K21.9 GASTROESOPHAGEAL REFLUX DISEASE WITHOUT ESOPHAGITIS: ICD-10-CM

## 2024-09-24 DIAGNOSIS — Z12.11 SCREENING FOR COLON CANCER: ICD-10-CM

## 2024-09-24 DIAGNOSIS — Z00.00 WELCOME TO MEDICARE PREVENTIVE VISIT: Primary | ICD-10-CM

## 2024-09-24 DIAGNOSIS — N40.1 BENIGN PROSTATIC HYPERPLASIA (BPH) WITH URINARY URGE INCONTINENCE: ICD-10-CM

## 2024-09-24 DIAGNOSIS — F51.04 CHRONIC INSOMNIA: ICD-10-CM

## 2024-09-24 DIAGNOSIS — M54.50 CHRONIC BILATERAL LOW BACK PAIN WITHOUT SCIATICA: ICD-10-CM

## 2024-09-24 PROCEDURE — 1159F MED LIST DOCD IN RCRD: CPT | Performed by: FAMILY MEDICINE

## 2024-09-24 PROCEDURE — G0402 INITIAL PREVENTIVE EXAM: HCPCS | Performed by: FAMILY MEDICINE

## 2024-09-24 PROCEDURE — 1036F TOBACCO NON-USER: CPT | Performed by: FAMILY MEDICINE

## 2024-09-24 PROCEDURE — 3075F SYST BP GE 130 - 139MM HG: CPT | Performed by: FAMILY MEDICINE

## 2024-09-24 PROCEDURE — 99214 OFFICE O/P EST MOD 30 MIN: CPT | Performed by: FAMILY MEDICINE

## 2024-09-24 PROCEDURE — 1123F ACP DISCUSS/DSCN MKR DOCD: CPT | Performed by: FAMILY MEDICINE

## 2024-09-24 PROCEDURE — 1160F RVW MEDS BY RX/DR IN RCRD: CPT | Performed by: FAMILY MEDICINE

## 2024-09-24 PROCEDURE — 3079F DIAST BP 80-89 MM HG: CPT | Performed by: FAMILY MEDICINE

## 2024-09-24 PROCEDURE — 1170F FXNL STATUS ASSESSED: CPT | Performed by: FAMILY MEDICINE

## 2024-09-24 PROCEDURE — 3008F BODY MASS INDEX DOCD: CPT | Performed by: FAMILY MEDICINE

## 2024-09-24 RX ORDER — FAMOTIDINE 20 MG/1
20 TABLET, FILM COATED ORAL DAILY
Qty: 90 TABLET | Refills: 3 | Status: SHIPPED | OUTPATIENT
Start: 2024-09-24

## 2024-09-24 RX ORDER — TADALAFIL 5 MG/1
5 TABLET ORAL DAILY
Qty: 30 TABLET | Refills: 11 | Status: SHIPPED | OUTPATIENT
Start: 2024-09-24 | End: 2025-09-19

## 2024-09-24 RX ORDER — TAMSULOSIN HYDROCHLORIDE 0.4 MG/1
0.4 CAPSULE ORAL DAILY
Qty: 90 CAPSULE | Refills: 1 | Status: SHIPPED | OUTPATIENT
Start: 2024-09-24

## 2024-09-24 RX ORDER — HYDROCODONE BITARTRATE AND ACETAMINOPHEN 5; 325 MG/1; MG/1
1 TABLET ORAL EVERY 6 HOURS PRN
Qty: 100 TABLET | Refills: 0 | Status: SHIPPED | OUTPATIENT
Start: 2024-09-24

## 2024-09-24 RX ORDER — ZOLPIDEM TARTRATE 10 MG/1
10 TABLET ORAL NIGHTLY PRN
Qty: 30 TABLET | Refills: 2 | Status: SHIPPED | OUTPATIENT
Start: 2024-09-24

## 2024-09-24 RX ORDER — CICLOPIROX 80 MG/ML
SOLUTION TOPICAL NIGHTLY
Qty: 6.6 ML | Refills: 11 | Status: SHIPPED | OUTPATIENT
Start: 2024-09-24

## 2024-09-24 RX ORDER — FINASTERIDE 5 MG/1
5 TABLET, FILM COATED ORAL DAILY
Qty: 90 TABLET | Refills: 3 | Status: SHIPPED | OUTPATIENT
Start: 2024-09-24

## 2024-09-24 RX ORDER — CICLOPIROX 80 MG/ML
SOLUTION TOPICAL NIGHTLY
COMMUNITY
End: 2024-09-24 | Stop reason: SDUPTHER

## 2024-09-24 ASSESSMENT — ENCOUNTER SYMPTOMS
HEADACHES: 0
COUGH: 0
PALPITATIONS: 0
CHILLS: 0
FREQUENCY: 0
WHEEZING: 0
DIZZINESS: 0
BACK PAIN: 1
CONSTIPATION: 0
WEAKNESS: 0
VOMITING: 0
FEVER: 0
LEG PAIN: 0
SHORTNESS OF BREATH: 0
SORE THROAT: 0
NUMBNESS: 0
DIARRHEA: 0
SWOLLEN GLANDS: 0
HEMATURIA: 0
RHINORRHEA: 0
INSOMNIA: 1
TREMORS: 0
ABDOMINAL PAIN: 0
DYSURIA: 0

## 2024-09-24 ASSESSMENT — VISUAL ACUITY
OD_CC: 20/10
OS_CC: 20/10

## 2024-09-24 ASSESSMENT — ACTIVITIES OF DAILY LIVING (ADL)
MANAGING_FINANCES: INDEPENDENT
GROCERY_SHOPPING: INDEPENDENT
BATHING: INDEPENDENT
DOING_HOUSEWORK: INDEPENDENT
TAKING_MEDICATION: INDEPENDENT
DRESSING: INDEPENDENT

## 2024-09-24 ASSESSMENT — LIFESTYLE VARIABLES: TOTAL SCORE: 0

## 2024-09-24 NOTE — PROGRESS NOTES
Subjective   Patient ID: Terry Lee is a 64 y.o. male who presents for Welcome To Medicare and Follow-up (Hypertension, Chronic Insomnia, Chronic Back Pain and labs).    He presents for Welcome to Medicare Visit and follow-up on hypertension. Cardiac symptoms: none. He has no chest pain, dyspnea, exertional chest pressure/discomfort, near-syncope, orthopnea, palpitations, paroxysmal nocturnal dyspnea, and syncope. Use of agents associated with hypertension: none. History of target organ damage: none.    Is also following up on persistent discoloration and thickening and pain of the toenails.  He was previously treated with Penlac with some improvement.  He would like to restart the treatment again.    Back Pain  This is a chronic problem. The current episode started more than 1 year ago. The problem occurs intermittently. The problem has been resolved since onset. The pain is present in the lumbar spine. The quality of the pain is described as aching. The pain does not radiate. Pertinent negatives include no abdominal pain, chest pain, dysuria, fever, headaches, leg pain, numbness or weakness.   Insomnia  This is a recurrent problem. The current episode started more than 1 year ago. The problem occurs constantly. The problem has been waxing and waning. Pertinent negatives include no abdominal pain, chest pain, chills, congestion, coughing, fever, headaches, numbness, sore throat, swollen glands, vomiting or weakness. Nothing aggravates the symptoms.     Past Medical, Surgical, and Family History reviewed and updated in chart.    Reviewed all medications by prescribing practitioner or clinical pharmacist (such as prescriptions, OTCs, herbal therapies and supplements) and documented in the medical record.    Patient Self Assessment of Health Status  Patient Self Assessment: Good    Nutrition and Exercise  Current Diet: Other Prescribed Diet  Adequate Fluid Intake: Yes  Caffeine: Yes  Exercise Frequency:  "Infrequently    Functional Ability/Level of Safety  Cognitive Impairment Observed: No cognitive impairment observed  Cognitive Impairment Reported: No cognitive impairment reported by patient or family    Home Safety Risk Factors: None     Review of Systems   Constitutional:  Negative for chills and fever.   HENT:  Negative for congestion, ear pain, nosebleeds, rhinorrhea and sore throat.    Respiratory:  Negative for cough, shortness of breath and wheezing.    Cardiovascular:  Negative for chest pain, palpitations and leg swelling.   Gastrointestinal:  Negative for abdominal pain, constipation, diarrhea and vomiting.   Genitourinary:  Negative for dysuria, frequency and hematuria.   Musculoskeletal:  Positive for back pain.   Neurological:  Negative for dizziness, tremors, weakness, numbness and headaches.   Psychiatric/Behavioral:  The patient has insomnia.        Objective   /80   Pulse (!) 46   Temp 36.2 °C (97.2 °F)   Resp 16   Ht 1.854 m (6' 1\")   Wt 132 kg (290 lb)   SpO2 96%   BMI 38.26 kg/m²     Physical Exam  Constitutional:       General: He is not in acute distress.     Appearance: Normal appearance.   HENT:      Head: Normocephalic and atraumatic.      Mouth/Throat:      Mouth: Mucous membranes are moist.      Pharynx: Oropharynx is clear. No oropharyngeal exudate or posterior oropharyngeal erythema.   Eyes:      General: No scleral icterus.     Extraocular Movements: Extraocular movements intact.      Pupils: Pupils are equal, round, and reactive to light.   Cardiovascular:      Rate and Rhythm: Normal rate and regular rhythm.      Pulses: Normal pulses.      Heart sounds: No murmur heard.     No friction rub. No gallop.   Pulmonary:      Effort: Pulmonary effort is normal.      Breath sounds: No wheezing, rhonchi or rales.   Feet:      Right foot:      Toenail Condition: Right toenails are abnormally thick. Fungal disease present.     Left foot:      Toenail Condition: Left toenails are " abnormally thick. Fungal disease present.  Skin:     General: Skin is warm.      Coloration: Skin is not jaundiced or pale.      Findings: No erythema or rash.   Neurological:      General: No focal deficit present.      Mental Status: He is alert and oriented to person, place, and time.      Cranial Nerves: No cranial nerve deficit.      Sensory: No sensory deficit.      Coordination: Coordination normal.      Gait: Gait normal.         Lab on 09/23/2024   Component Date Value Ref Range Status    WBC 09/23/2024 5.7  4.4 - 11.3 x10*3/uL Final    nRBC 09/23/2024 0.0  0.0 - 0.0 /100 WBCs Final    RBC 09/23/2024 4.81  4.50 - 5.90 x10*6/uL Final    Hemoglobin 09/23/2024 14.1  13.5 - 17.5 g/dL Final    Hematocrit 09/23/2024 43.5  41.0 - 52.0 % Final    MCV 09/23/2024 90  80 - 100 fL Final    MCH 09/23/2024 29.3  26.0 - 34.0 pg Final    MCHC 09/23/2024 32.4  32.0 - 36.0 g/dL Final    RDW 09/23/2024 14.6 (H)  11.5 - 14.5 % Final    Platelets 09/23/2024 189  150 - 450 x10*3/uL Final    Neutrophils % 09/23/2024 60.8  40.0 - 80.0 % Final    Immature Granulocytes %, Automated 09/23/2024 0.2  0.0 - 0.9 % Final    Immature Granulocyte Count (IG) includes promyelocytes, myelocytes and metamyelocytes but does not include bands. Percent differential counts (%) should be interpreted in the context of the absolute cell counts (cells/UL).    Lymphocytes % 09/23/2024 27.3  13.0 - 44.0 % Final    Monocytes % 09/23/2024 10.3  2.0 - 10.0 % Final    Eosinophils % 09/23/2024 0.9  0.0 - 6.0 % Final    Basophils % 09/23/2024 0.5  0.0 - 2.0 % Final    Neutrophils Absolute 09/23/2024 3.44  1.20 - 7.70 x10*3/uL Final    Percent differential counts (%) should be interpreted in the context of the absolute cell counts (cells/uL).    Immature Granulocytes Absolute, Au* 09/23/2024 0.01  0.00 - 0.70 x10*3/uL Final    Lymphocytes Absolute 09/23/2024 1.54  1.20 - 4.80 x10*3/uL Final    Monocytes Absolute 09/23/2024 0.58  0.10 - 1.00 x10*3/uL Final     Eosinophils Absolute 09/23/2024 0.05  0.00 - 0.70 x10*3/uL Final    Basophils Absolute 09/23/2024 0.03  0.00 - 0.10 x10*3/uL Final    Glucose 09/23/2024 96  74 - 99 mg/dL Final    Sodium 09/23/2024 141  136 - 145 mmol/L Final    Potassium 09/23/2024 4.9  3.5 - 5.3 mmol/L Final    Chloride 09/23/2024 100  98 - 107 mmol/L Final    Bicarbonate 09/23/2024 33 (H)  21 - 32 mmol/L Final    Anion Gap 09/23/2024 13  10 - 20 mmol/L Final    Urea Nitrogen 09/23/2024 18  6 - 23 mg/dL Final    Creatinine 09/23/2024 0.78  0.50 - 1.30 mg/dL Final    eGFR 09/23/2024 >90  >60 mL/min/1.73m*2 Final    Calculations of estimated GFR are performed using the 2021 CKD-EPI Study Refit equation without the race variable for the IDMS-Traceable creatinine methods.  https://jasn.asnjournals.org/content/early/2021/09/22/ASN.6255578276    Calcium 09/23/2024 9.2  8.6 - 10.3 mg/dL Final    Albumin 09/23/2024 4.2  3.4 - 5.0 g/dL Final    Alkaline Phosphatase 09/23/2024 57  33 - 136 U/L Final    Total Protein 09/23/2024 7.0  6.4 - 8.2 g/dL Final    AST 09/23/2024 21  9 - 39 U/L Final    Bilirubin, Total 09/23/2024 0.8  0.0 - 1.2 mg/dL Final    ALT 09/23/2024 22  10 - 52 U/L Final    Patients treated with Sulfasalazine may generate falsely decreased results for ALT.    Cholesterol 09/23/2024 230 (H)  0 - 199 mg/dL Final          Age      Desirable   Borderline High   High     0-19 Y     0 - 169       170 - 199     >/= 200    20-24 Y     0 - 189       190 - 224     >/= 225         >24 Y     0 - 199       200 - 239     >/= 240   **All ranges are based on fasting samples. Specific   therapeutic targets will vary based on patient-specific   cardiac risk.    Pediatric guidelines reference:Pediatrics 2011, 128(S5).Adult guidelines reference: NCEP ATPIII Guidelines,GOKUL 2001, 258:2486-97    Venipuncture immediately after or during the administration of Metamizole may lead to falsely low results. Testing should be performed immediately prior to Metamizole  dosing.    HDL-Cholesterol 09/23/2024 102.3  mg/dL Final      Age       Very Low   Low     Normal    High    0-19 Y    < 35      < 40     40-45     ----  20-24 Y    ----     < 40      >45      ----        >24 Y      ----     < 40     40-60      >60      Cholesterol/HDL Ratio 09/23/2024 2.2   Final      Ref Values  Desirable  < 3.4  High Risk  > 5.0    LDL Calculated 09/23/2024 110 (H)  <=99 mg/dL Final                                Near   Borderline      AGE      Desirable  Optimal    High     High     Very High     0-19 Y     0 - 109     ---    110-129   >/= 130     ----    20-24 Y     0 - 119     ---    120-159   >/= 160     ----      >24 Y     0 -  99   100-129  130-159   160-189     >/=190      VLDL 09/23/2024 18  0 - 40 mg/dL Final    Triglycerides 09/23/2024 89  0 - 149 mg/dL Final       Age         Desirable   Borderline High   High     Very High   0 D-90 D    19 - 174         ----         ----        ----  91 D- 9 Y     0 -  74        75 -  99     >/= 100      ----    10-19 Y     0 -  89        90 - 129     >/= 130      ----    20-24 Y     0 - 114       115 - 149     >/= 150      ----         >24 Y     0 - 149       150 - 199    200- 499    >/= 500    Venipuncture immediately after or during the administration of Metamizole may lead to falsely low results. Testing should be performed immediately prior to Metamizole dosing.    Non HDL Cholesterol 09/23/2024 128  0 - 149 mg/dL Final          Age       Desirable   Borderline High   High     Very High     0-19 Y     0 - 119       120 - 144     >/= 145    >/= 160    20-24 Y     0 - 149       150 - 189     >/= 190      ----         >24 Y    30 mg/dL above LDL Cholesterol goal      Prostate Specific Antigen,Screen 09/23/2024 1.49  <=4.00 ng/mL Final     Assessment/Plan   Problem List Items Addressed This Visit       Benign prostatic hyperplasia (BPH) with urinary urge incontinence     Stable, continue current medications and management.         Relevant  "Medications    tamsulosin (Flomax) 0.4 mg 24 hr capsule    finasteride (Proscar) 5 mg tablet    Chronic bilateral low back pain without sciatica     Stable, continue current medications and management.  Natural history and expected course discussed. Questions answered.  Educational material distributed.  Proper lifting, bending technique discussed.  Stretching exercises discussed.  Regular aerobic and trunk strengthening exercises discussed.  Ice to affected area as needed for local pain relief.  Heat to affected area as needed for local pain relief.         Relevant Medications    HYDROcodone-acetaminophen (Norco) 5-325 mg tablet    Other Relevant Orders    Follow Up In [facility] - PCP - Established    Chronic insomnia     Stable, continue current medications and management.         Relevant Medications    zolpidem (Ambien) 10 mg tablet    Other Relevant Orders    Follow Up In [facility] - PCP - Established    Class 2 severe obesity due to excess calories with serious comorbidity and body mass index (BMI) of 38.0 to 38.9 in adult (Multi)     Continue decrease calorie diet and not more than 1500 calorie per day diet and low-fat diet.  Continue with regular exercise program.  We advised exercise at least 5 days a week for at least 45 minutes and also a minimum of 10,000 steps a day.  The detrimental effects of obesity on health were discussed.         Elevated PSA     We will recheck his PSA in 6 months         Relevant Orders    PSA    Essential hypertension     Well-controlled, continue current medications and management.  Dietary sodium restriction.  Regular aerobic exercise program is recommended to help achieve and maintain normal body weight, fitness and improve lipid balance. .  Dietary changes: Increase soluble fiber  Plant sterols 2grams per day (e.g. Benecol)  Reduce saturated fat, \"trans\" monounsaturated fatty acids, and cholesterol         Relevant Orders    Follow Up In [facility] " [facility] - PCP - Established    Gastroesophageal reflux disease without esophagitis     Well-controlled, continue current medications and management.         Relevant Medications    famotidine (Pepcid) 20 mg tablet    Onychomycosis of toenail     Use Penlac solution as ordered.         Relevant Medications    ciclopirox (Penlac) 8 % solution    Primary localized osteoarthrosis of multiple sites     Stable, continue current medications and management.         Relevant Orders    Follow Up In [facility] - PCP - Established    Welcome to Medicare preventive visit - Primary     Recommend low-cholesterol diet, low-fat diet and low-salt diet.  The need for lifelong dietary compliance in order to reduce cardiac risk is recommended.  We will also recommend regular exercise program to improve lipid balance and overall health.  Recommend decreasing fat and cholesterol in diet, increasing aerobic exercise with a goal of 4 or more days per week          Other Visit Diagnoses       Erectile dysfunction, unspecified erectile dysfunction type        Relevant Medications    tadalafil (Cialis) 5 mg tablet    Screening for colon cancer        Relevant Orders    Colonoscopy Screening; Average Risk Patient          OARRS:  Griffin Sharma MD on 9/24/2024 11:15 AM  I have personally reviewed the OARRS report for Terry Lee. I have considered the risks of abuse, dependence, addiction and diversion    Is the patient prescribed a combination of a benzodiazepine and opioid?  No    Last Urine Drug Screen / ordered today: No  Recent Results (from the past 8760 hour(s))   POCT waived urine drug screen manually resulted    Collection Time: 06/20/24 12:19 PM   Result Value Ref Range    POC THC Negative Negative, Not applicable ng/mL    POC Cocaine Negative Negative, Not applicable ng/mL    POC Opiates Negative Negative, Not applicable ng/mL    POC Amphetamine Negative Negative, Not applicable ng/mL    POC Phencyclidine  (PCP) Negative Negative, Not applicable ng/mL    POC Barbiturates Negative Negative, Not applicable ng/mL    POC Benzodiazepines Negative Negative, Not applicable ng/mL    POC Methamphetamine Negative Negative, Not applicable ng/mL    POC METHADONE MANUALLY ENTERED Negative Negative, Not applicable ng/mL    POC Ticyclic Antidepressants (TCA) Negative Negative, Not applicable ng/mL    POC Oxycodone Negative Negative, Not applicable ng/mL    POC MDMA URINE Negative Negative, Not applicable ng/mL    POC Morphine Urine Negative Negative, Not applicable ng/mL    POC Burprenorphine Urine Negative Negative, Not applicable ng/mL   Zolpidem Confirmation, Urine    Collection Time: 06/20/24 12:17 PM   Result Value Ref Range    Zolpidem 44 (H) <25 ng/mL    Zolpidem Metabolite (ZCA) >1,000 (H) <25 ng/mL       Controlled Substance Agreement:  Date of the Last Agreement: 3/27/24  Reviewed Controlled Substance Agreement including but not limited to the benefits, risks, and alternatives to treatment with a Controlled Substance medication(s).    Opioids:  What is the patient's goal of therapy? Pain control and to be able to walk and perform activities of daily living without pain     Is this being achieved with current treatment? yes    I have calculated the patient's Morphine Dose Equivalent (MED):   I have considered referral to Pain Management and/or a specialist, and do not feel it is necessary at this time.    I feel that it is clinically indicated to continue this current medication regimen after consideration of alternative therapies, and other non-opioid treatment.    Opioid Risk Screening:  Family History of Substance Abuse  Alcohol: 0 (9/24/2024 10:00 AM)  Illegal Drugs: 0 (9/24/2024 10:00 AM)  Prescription Drugs: 0 (9/24/2024 10:00 AM)    Personal History of Substance Abuse  Alcohol: 0 (9/24/2024 10:00 AM)  Illegal Drugs: 0 (9/24/2024 10:00 AM)  Prescription Drugs: 0 (9/24/2024 10:00 AM)    Patient Age (16-45)  Age  (16-45): 0 (9/24/2024 10:00 AM)    History of Preadolescent Sexual Abuse  History of Preadolescent Sexual Abuse: 0 (9/24/2024 10:00 AM)    Psychological Disease  Attention Deficit Disorder, Obsessive Compulsive Disorder, Bipolar, Schizophrenia: 0 (9/24/2024 10:00 AM)  Depression: 0 (9/24/2024 10:00 AM)    Total Score  Total Score: 0 (9/24/2024 10:00 AM)    Total Score Risk Category  TOTAL SCORE CATEGORY: Low Risk (0-3) (9/24/2024 10:00 AM)    Pain Assessment:  Analgesia  What was your pain level on average during the past week?: 0 - No pain  What was your pain level at its worst during the past week?: 2  What percentage of your pain has been relieved during the past week?: 90 %  Is the amount of pain relief you are now obtaining from your current pain relievers enough to make a real difference in your life?: Y  Query to Clinician: Is the patient's pain relief clinically significant?: Yes    Activities of Daily Living  Physical Functioning: Better  Family Relationships: Better  Social Relationships: Better  Mood: Better  Sleep Patterns: Better  Overall Functioning: Better    Adverse Events  Is patient experiencing any side effects from current pain relievers?: N  Patient's Overall Severity of Side Effects: None      Assessment  Is your overall impression that this patient is benefiting from opioid therapy?: Yes  Specific Analgesic Plan: Continue present regimen    Scribe Attestation  By signing my name below, IJairon, Scribe   attest that this documentation has been prepared under the direction and in the presence of Griffin Sharma MD.

## 2024-09-24 NOTE — PATIENT INSTRUCTIONS
BMI was above normal measurement. Current weight: 132 kg (290 lb)  Weight change since last visit (-) denotes wt loss -1 lbs   Weight loss needed to achieve BMI 25: 100.9 Lbs  Weight loss needed to achieve BMI 30: 63.1 Lbs  Advised to Increase physical activity.

## 2024-10-29 DIAGNOSIS — Z12.11 COLON CANCER SCREENING: Primary | ICD-10-CM

## 2024-10-30 ENCOUNTER — ANESTHESIA EVENT (OUTPATIENT)
Dept: GASTROENTEROLOGY | Facility: EXTERNAL LOCATION | Age: 64
End: 2024-10-30

## 2024-10-30 RX ORDER — POLYETHYLENE GLYCOL 3350, SODIUM CHLORIDE, SODIUM BICARBONATE, POTASSIUM CHLORIDE 420; 11.2; 5.72; 1.48 G/4L; G/4L; G/4L; G/4L
4000 POWDER, FOR SOLUTION ORAL ONCE
Qty: 4000 ML | Refills: 0 | Status: SHIPPED | OUTPATIENT
Start: 2024-10-30 | End: 2024-10-30

## 2024-11-04 ENCOUNTER — ANESTHESIA (OUTPATIENT)
Dept: GASTROENTEROLOGY | Facility: EXTERNAL LOCATION | Age: 64
End: 2024-11-04

## 2024-11-04 ENCOUNTER — APPOINTMENT (OUTPATIENT)
Dept: GASTROENTEROLOGY | Facility: EXTERNAL LOCATION | Age: 64
End: 2024-11-04
Payer: COMMERCIAL

## 2024-11-04 ENCOUNTER — TELEPHONE (OUTPATIENT)
Dept: GASTROENTEROLOGY | Facility: CLINIC | Age: 64
End: 2024-11-04

## 2024-11-04 ENCOUNTER — TELEPHONE (OUTPATIENT)
Dept: PRIMARY CARE | Facility: CLINIC | Age: 64
End: 2024-11-04

## 2024-11-04 VITALS
HEIGHT: 73 IN | HEART RATE: 79 BPM | TEMPERATURE: 97.3 F | OXYGEN SATURATION: 94 % | DIASTOLIC BLOOD PRESSURE: 106 MMHG | SYSTOLIC BLOOD PRESSURE: 162 MMHG | BODY MASS INDEX: 36.58 KG/M2 | RESPIRATION RATE: 25 BRPM | WEIGHT: 276 LBS

## 2024-11-04 DIAGNOSIS — Z12.11 SCREENING FOR COLON CANCER: ICD-10-CM

## 2024-11-04 RX ORDER — GLYCOPYRROLATE 0.2 MG/ML
INJECTION INTRAMUSCULAR; INTRAVENOUS AS NEEDED
Status: DISCONTINUED | OUTPATIENT
Start: 2024-11-04 | End: 2024-11-04

## 2024-11-04 RX ORDER — ONDANSETRON HYDROCHLORIDE 2 MG/ML
4 INJECTION, SOLUTION INTRAVENOUS ONCE AS NEEDED
Status: DISCONTINUED | OUTPATIENT
Start: 2024-11-04 | End: 2024-11-05 | Stop reason: HOSPADM

## 2024-11-04 SDOH — HEALTH STABILITY: MENTAL HEALTH: CURRENT SMOKER: 0

## 2024-11-04 ASSESSMENT — PAIN SCALES - GENERAL
PAINLEVEL_OUTOF10: 0 - NO PAIN
PAINLEVEL_OUTOF10: 0 - NO PAIN
PAIN_LEVEL: 0
PAINLEVEL_OUTOF10: 0 - NO PAIN

## 2024-11-04 ASSESSMENT — PAIN - FUNCTIONAL ASSESSMENT
PAIN_FUNCTIONAL_ASSESSMENT: 0-10

## 2024-11-04 ASSESSMENT — COLUMBIA-SUICIDE SEVERITY RATING SCALE - C-SSRS
1. IN THE PAST MONTH, HAVE YOU WISHED YOU WERE DEAD OR WISHED YOU COULD GO TO SLEEP AND NOT WAKE UP?: NO
2. HAVE YOU ACTUALLY HAD ANY THOUGHTS OF KILLING YOURSELF?: NO
6. HAVE YOU EVER DONE ANYTHING, STARTED TO DO ANYTHING, OR PREPARED TO DO ANYTHING TO END YOUR LIFE?: NO

## 2024-11-04 NOTE — H&P
Outpatient Hospital Procedure H&P    Patient Profile-Procedures  Initial Info  Patient Demographics  Name Terry Lee  Date of Birth 1960  MRN [id number]  Address   [address] [id number] [facility] [address]    Primary Phone Number [phone]  Secondary Phone Number    PCP Griffin Sharma    Procedure(s):  Colonoscopy    Primary contact name and number   Extended Emergency Contact Information  Primary Emergency Contact: Aspen Lee   [facility]  Home Phone: [phone]  Work Phone: [phone]  Relation: Spouse  Secondary Emergency Contact: hernánharjit   [place]  Mobile Phone: [phone]  Relation: Son    General Health  Weight   Vitals:    11/04/24 1344   Weight: 125 kg (276 lb)     BMI Body mass index is 36.41 kg/m².    Allergies  No Known Allergies    Past Medical History   Past Medical History:   Diagnosis Date    Arthritis     Benign prostatic hyperplasia with lower urinary tract symptoms     Benign localized hyperplasia of prostate with urinary obstruction and lower urinary tract symptoms    COVID-19     VACCINATED    De Quervain's disease (tenosynovitis)     GERD (gastroesophageal reflux disease)     Hyperlipidemia     Hypertension     Insomnia     Intra-abdominal abscess (Multi) 02/15/2023    Joint pain     Nephrolithiasis     Personal history of other endocrine, nutritional and metabolic disease     History of vitamin D deficiency    Personal history of other specified conditions     History of gross hematuria    Sleep apnea     RESOLVED    Wears glasses        Provider assessment  Diagnosis: H/o polyps    Medication Reviewed - yes  Prior to Admission medications    Medication Sig Start Date End Date Taking? Authorizing Provider   acetaminophen (Tylenol) 325 mg tablet Take 1 tablet (325 mg) by mouth every 6 hours if needed. 6/14/16  Yes Historical Provider, MD   cyanocobalamin, vitamin B-12, 1,000 mcg tablet,  sublingual Place under the tongue.   Yes Historical Provider, MD   famotidine (Pepcid) 20 mg tablet Take 1 tablet (20 mg) by mouth once daily. 9/24/24  Yes Griffin Sharma MD   finasteride (Proscar) 5 mg tablet Take 1 tablet (5 mg) by mouth once daily. 9/24/24  Yes Griffin Sharma MD   HYDROcodone-acetaminophen (Norco) 5-325 mg tablet Take 1 tablet by mouth every 6 hours if needed for severe pain (7 - 10) (for 30 days). 9/24/24  Yes Griffin Sharma MD   multivit with minerals/lutein (MULTIVITAMIN 50 PLUS ORAL) Take 1 tablet by mouth once daily.   Yes Historical Provider, MD   tadalafil (Cialis) 5 mg tablet Take 1 tablet (5 mg) by mouth once daily. 9/24/24 9/19/25 Yes Griffin Sharma MD   tamsulosin (Flomax) 0.4 mg 24 hr capsule Take 1 capsule (0.4 mg) by mouth once daily. 9/24/24  Yes Griffin Sharma MD   valsartan-hydrochlorothiazide (Diovan-HCT) 160-12.5 mg tablet Take 1 tablet by mouth once daily. 6/20/24  Yes Griffin Sharma MD   zolpidem (Ambien) 10 mg tablet Take 1 tablet (10 mg) by mouth as needed at bedtime for sleep. 9/24/24  Yes Griffin Sharma MD   ciclopirox (Penlac) 8 % solution Apply topically once daily at bedtime. 9/24/24   Griffin Sharma MD   naloxone (Narcan) 4 mg/0.1 mL nasal spray Use as directed 9/28/20   Historical Provider, MD   polyethylene glycol-electrolytes (Nulytely Lemon-Lime) 420 gram solution Take 4,000 mL by mouth 1 time for 1 dose. 10/30/24 10/30/24  Cj Lazaro MD       Physical Exam  Vitals:    11/04/24 1348   BP: 174/75   Pulse:    Resp:    Temp:    SpO2:         General: A&Ox3, NAD.  CV: RRR. No murmur.  Resp: CTA bilaterally. No wheezing, rhonchi or rales.   Extrem: No edema.       Oropharyngeal Classification III (soft and hard palate and base of uvula visible)  ASA PS Classification 3  Sedation Plan Deep  Procedure Plan - pre-procedural (re)assesment completed by physician:  discharge/transfer patient when discharge criteria met    Cj Lazaro MD  11/4/2024 2:08  PM    Addendum:  Patient was brought to procedure room for surveillance colonoscopy.  HR in the 30s.  Procedure canceled.  Advised to see cardiology.  Cj Lazaro MD

## 2024-11-04 NOTE — TELEPHONE ENCOUNTER
Patient was brought to procedure room for surveillance colonoscopy.  HR in the 30s.  Procedure canceled.  Advised to see cardiology/PCP.  Will reschedule after evaluation.   Cj Lazaro MD

## 2024-11-04 NOTE — ANESTHESIA POSTPROCEDURE EVALUATION
Patient: Terry Lee    Procedure Summary       Date: 11/04/24 Room / Location: [facility]    Anesthesia Start: 1408 Anesthesia Stop: 1443    Procedure: COLONOSCOPY Diagnosis: Screening for colon cancer    Scheduled Providers: Cj Lazaro MD; MANISH Newman Responsible Provider: MANISH Newman    Anesthesia Type: MAC ASA Status: 3            Anesthesia Type: MAC    Vitals Value Taken Time   /106 11/04/24 1508   Temp 36.3 °C (97.3 °F) 11/04/24 1445   Pulse 79 11/04/24 1508   Resp 25 11/04/24 1508   SpO2 94 % 11/04/24 1508       Anesthesia Post Evaluation    Patient location during evaluation: bedside  Patient participation: complete - patient participated  Level of consciousness: awake and alert  Pain score: 0  Pain management: adequate  Airway patency: patent  Cardiovascular status: hypertensive and bradycardic  Respiratory status: acceptable  Hydration status: acceptable  Postoperative Nausea and Vomiting: none        No notable events documented.

## 2024-11-04 NOTE — ANESTHESIA PREPROCEDURE EVALUATION
Patient: Terry Lee    Procedure Information       Date/Time: 11/04/24 1430    Scheduled providers: Cj Lazaro MD; MANISH Newman    Procedure: COLONOSCOPY    Location: [facility]            Relevant Problems   Anesthesia (within normal limits)      Cardiac   (+) Essential hypertension   (+) Other chest pain      Pulmonary   (+) SOB (shortness of breath) on exertion      Neuro   (+) Piriformis syndrome of both sides      GI   (+) Gastroesophageal reflux disease without esophagitis      /Renal   (+) Benign prostatic hyperplasia (BPH) with urinary urge incontinence   (+) Left nephrolithiasis      Liver   (+) Cholecystitis      Endocrine   (+) Class 2 severe obesity due to excess calories with serious comorbidity and body mass index (BMI) of 38.0 to 38.9 in adult      Hematology (within normal limits)      Musculoskeletal   (+) Chronic bilateral low back pain without sciatica   (+) Knee osteoarthritis   (+) Primary localized osteoarthrosis of multiple sites   (+) Primary osteoarthritis of both knees      HEENT   (+) Acute recurrent maxillary sinusitis   (+) Sinus congestion      ID   (+) Infected sebaceous cyst   (+) Onychomycosis of toenail       Clinical information reviewed:   Tobacco  Allergies  Meds   Med Hx  Surg Hx   Fam Hx  Soc Hx        NPO Detail:  NPO/Void Status  Carbohydrate Drink Given Prior to Surgery? : Y  Date of Last Liquid: 11/04/24  Time of Last Liquid: 0900  Date of Last Solid: 11/02/24  Time of Last Solid: 1200  Last Intake Type: Clear fluids; GI prep  Time of Last Void: 1347         Physical Exam    Airway  Mallampati: I  TM distance: >3 FB  Neck ROM: full     Cardiovascular   Rhythm: regular     Dental    Pulmonary   Breath sounds clear to auscultation     Abdominal            Anesthesia Plan    History of general anesthesia?: yes  History of complications of general anesthesia?: no    ASA 3     MAC     The patient is not a current  smoker.    intravenous induction   Anesthetic plan and risks discussed with patient.

## 2024-11-05 NOTE — TELEPHONE ENCOUNTER
Discussed the bradycardia at the endoscopy unit with the wife today.  She has been the details of the findings.  He was noted to be bradycardic prior to sedation and the procedure was not performed.  He was given Robinul with improvement in his heart rate prior to going home.  She is going to schedule an appointment with the cardiologist in [facility].  He was advised to monitor his pulse at home very closely.  He is not on beta-blocker.

## 2024-12-23 ENCOUNTER — APPOINTMENT (OUTPATIENT)
Dept: PRIMARY CARE | Facility: CLINIC | Age: 64
End: 2024-12-23
Payer: MEDICARE

## 2024-12-23 VITALS
WEIGHT: 294 LBS | RESPIRATION RATE: 14 BRPM | DIASTOLIC BLOOD PRESSURE: 82 MMHG | HEART RATE: 64 BPM | OXYGEN SATURATION: 96 % | HEIGHT: 73 IN | SYSTOLIC BLOOD PRESSURE: 124 MMHG | BODY MASS INDEX: 38.97 KG/M2 | TEMPERATURE: 97.8 F

## 2024-12-23 DIAGNOSIS — M19.91 PRIMARY LOCALIZED OSTEOARTHROSIS OF MULTIPLE SITES: ICD-10-CM

## 2024-12-23 DIAGNOSIS — M54.50 CHRONIC BILATERAL LOW BACK PAIN WITHOUT SCIATICA: ICD-10-CM

## 2024-12-23 DIAGNOSIS — G89.29 CHRONIC BILATERAL LOW BACK PAIN WITHOUT SCIATICA: ICD-10-CM

## 2024-12-23 DIAGNOSIS — F51.04 CHRONIC INSOMNIA: ICD-10-CM

## 2024-12-23 DIAGNOSIS — E66.01 CLASS 2 SEVERE OBESITY DUE TO EXCESS CALORIES WITH SERIOUS COMORBIDITY AND BODY MASS INDEX (BMI) OF 38.0 TO 38.9 IN ADULT: ICD-10-CM

## 2024-12-23 DIAGNOSIS — N52.9 ERECTILE DYSFUNCTION, UNSPECIFIED ERECTILE DYSFUNCTION TYPE: ICD-10-CM

## 2024-12-23 DIAGNOSIS — E66.812 CLASS 2 SEVERE OBESITY DUE TO EXCESS CALORIES WITH SERIOUS COMORBIDITY AND BODY MASS INDEX (BMI) OF 38.0 TO 38.9 IN ADULT: ICD-10-CM

## 2024-12-23 DIAGNOSIS — I10 ESSENTIAL HYPERTENSION: Primary | ICD-10-CM

## 2024-12-23 PROCEDURE — 3079F DIAST BP 80-89 MM HG: CPT | Performed by: FAMILY MEDICINE

## 2024-12-23 PROCEDURE — 1123F ACP DISCUSS/DSCN MKR DOCD: CPT | Performed by: FAMILY MEDICINE

## 2024-12-23 PROCEDURE — 99214 OFFICE O/P EST MOD 30 MIN: CPT | Performed by: FAMILY MEDICINE

## 2024-12-23 PROCEDURE — 1160F RVW MEDS BY RX/DR IN RCRD: CPT | Performed by: FAMILY MEDICINE

## 2024-12-23 PROCEDURE — 3008F BODY MASS INDEX DOCD: CPT | Performed by: FAMILY MEDICINE

## 2024-12-23 PROCEDURE — G2211 COMPLEX E/M VISIT ADD ON: HCPCS | Performed by: FAMILY MEDICINE

## 2024-12-23 PROCEDURE — 1036F TOBACCO NON-USER: CPT | Performed by: FAMILY MEDICINE

## 2024-12-23 PROCEDURE — 1159F MED LIST DOCD IN RCRD: CPT | Performed by: FAMILY MEDICINE

## 2024-12-23 PROCEDURE — 3074F SYST BP LT 130 MM HG: CPT | Performed by: FAMILY MEDICINE

## 2024-12-23 RX ORDER — VALSARTAN AND HYDROCHLOROTHIAZIDE 160; 12.5 MG/1; MG/1
1 TABLET, FILM COATED ORAL DAILY
Qty: 90 TABLET | Refills: 1 | Status: SHIPPED | OUTPATIENT
Start: 2024-12-23

## 2024-12-23 RX ORDER — TADALAFIL 5 MG/1
5 TABLET ORAL DAILY
Qty: 90 TABLET | Refills: 1 | Status: SHIPPED | OUTPATIENT
Start: 2024-12-23

## 2024-12-23 RX ORDER — ZOLPIDEM TARTRATE 10 MG/1
10 TABLET ORAL NIGHTLY PRN
Qty: 30 TABLET | Refills: 2 | Status: SHIPPED | OUTPATIENT
Start: 2024-12-23

## 2024-12-23 RX ORDER — HYDROCODONE BITARTRATE AND ACETAMINOPHEN 5; 325 MG/1; MG/1
1 TABLET ORAL EVERY 6 HOURS PRN
Qty: 100 TABLET | Refills: 0 | Status: SHIPPED | OUTPATIENT
Start: 2024-12-23

## 2024-12-23 ASSESSMENT — ENCOUNTER SYMPTOMS
VERTIGO: 0
MYALGIAS: 0
WHEEZING: 0
FATIGUE: 1
RHINORRHEA: 0
ARTHRALGIAS: 1
NECK PAIN: 0
WEAKNESS: 0
INSOMNIA: 1
DYSURIA: 0
COUGH: 0
DIAPHORESIS: 0
HEMATURIA: 0
VISUAL CHANGE: 0
FREQUENCY: 0
JOINT SWELLING: 0
FEVER: 0
TREMORS: 0
POLYPHAGIA: 0
ANOREXIA: 0
HEADACHES: 0
POLYDIPSIA: 0
SORE THROAT: 0
BACK PAIN: 1
ABDOMINAL PAIN: 0
NUMBNESS: 0
CHANGE IN BOWEL HABIT: 0
DIARRHEA: 0
VOMITING: 0
SWOLLEN GLANDS: 0
DIZZINESS: 0
PALPITATIONS: 0
CHILLS: 0
CONSTIPATION: 0
SHORTNESS OF BREATH: 0
NAUSEA: 0

## 2024-12-23 NOTE — PROGRESS NOTES
Subjective   Patient ID: Terry Lee is a 64 y.o. male who presents for Follow-up (Hypertension, Chronic Insomnia and Chronic Back Pain).    Patient is here for follow-up on hypertension. Cardiac symptoms: none. He has no chest pain, dyspnea, exertional chest pressure/discomfort, near-syncope, orthopnea, palpitations, paroxysmal nocturnal dyspnea, and syncope. Use of agents associated with hypertension: none. History of target organ damage: none.    Insomnia  This is a chronic problem. The current episode started more than 1 year ago. The problem occurs constantly. The problem has been unchanged. Associated symptoms include arthralgias and fatigue. Pertinent negatives include no abdominal pain, anorexia, change in bowel habit, chest pain, chills, congestion, coughing, diaphoresis, fever, headaches, joint swelling, myalgias, nausea, neck pain, numbness, rash, sore throat, swollen glands, urinary symptoms, vertigo, visual change, vomiting or weakness. Nothing aggravates the symptoms. He has tried nothing for the symptoms. The treatment provided no relief.   Back Pain  The current episode started more than 1 year ago. The problem is unchanged. The pain is present in the lumbar spine. The quality of the pain is described as aching. The pain does not radiate. Pertinent negatives include no abdominal pain, chest pain, dysuria, fever, headaches, numbness or weakness.        Review of Systems   Constitutional:  Positive for fatigue. Negative for chills, diaphoresis and fever.   HENT:  Negative for congestion, ear pain, nosebleeds, rhinorrhea and sore throat.    Respiratory:  Negative for cough, shortness of breath and wheezing.    Cardiovascular:  Negative for chest pain, palpitations and leg swelling.   Gastrointestinal:  Negative for abdominal pain, anorexia, change in bowel habit, constipation, diarrhea, nausea and vomiting.   Endocrine: Negative for cold intolerance, heat intolerance, polydipsia, polyphagia and  "polyuria.   Genitourinary:  Negative for dysuria, frequency and hematuria.   Musculoskeletal:  Positive for arthralgias and back pain. Negative for joint swelling, myalgias and neck pain.   Skin:  Negative for rash.   Neurological:  Negative for dizziness, vertigo, tremors, weakness, numbness and headaches.   Psychiatric/Behavioral:  The patient has insomnia.        Objective   /82   Pulse 64   Temp 36.6 °C (97.8 °F)   Resp 14   Ht 1.854 m (6' 1\")   Wt 133 kg (294 lb)   SpO2 96%   BMI 38.79 kg/m²     Physical Exam  Constitutional:       General: He is not in acute distress.     Appearance: Normal appearance.   HENT:      Head: Normocephalic and atraumatic.      Mouth/Throat:      Mouth: Mucous membranes are moist.      Pharynx: Oropharynx is clear. No oropharyngeal exudate or posterior oropharyngeal erythema.   Eyes:      General: No scleral icterus.     Extraocular Movements: Extraocular movements intact.      Pupils: Pupils are equal, round, and reactive to light.   Cardiovascular:      Rate and Rhythm: Normal rate and regular rhythm.      Pulses: Normal pulses.      Heart sounds: No murmur heard.     No friction rub. No gallop.   Pulmonary:      Effort: Pulmonary effort is normal.      Breath sounds: No wheezing, rhonchi or rales.   Skin:     General: Skin is warm.      Coloration: Skin is not jaundiced or pale.      Findings: No erythema or rash.   Neurological:      General: No focal deficit present.      Mental Status: He is alert and oriented to person, place, and time.      Cranial Nerves: No cranial nerve deficit.      Sensory: No sensory deficit.      Coordination: Coordination normal.      Gait: Gait normal.         Assessment/Plan   Problem List Items Addressed This Visit       Chronic bilateral low back pain without sciatica     Stable, continue current medications and management.  Natural history and expected course discussed. Questions answered.  Educational material distributed.  Proper " "lifting, bending technique discussed.  Stretching exercises discussed.  Regular aerobic and trunk strengthening exercises discussed.  Ice to affected area as needed for local pain relief.  Heat to affected area as needed for local pain relief.         Relevant Medications    HYDROcodone-acetaminophen (Norco) 5-325 mg tablet    Other Relevant Orders    Follow Up In [facility] - PCP - Established    Chronic insomnia     Stable, continue current medications and management.         Relevant Medications    zolpidem (Ambien) 10 mg tablet    Other Relevant Orders    Follow Up In [facility] - PCP - Established    Class 2 severe obesity due to excess calories with serious comorbidity and body mass index (BMI) of 38.0 to 38.9 in adult     Continue decrease calorie diet and not more than 1500 calorie per day diet and low-fat diet.  Continue with regular exercise program.  We advised exercise at least 5 days a week for at least 45 minutes and also a minimum of 10,000 steps a day.  The detrimental effects of obesity on health were discussed.         Erectile dysfunction     Continue current medications and management.         Relevant Medications    tadalafil (Cialis) 5 mg tablet    Essential hypertension - Primary     Well-controlled, continue current medications and management.  Dietary sodium restriction.  Regular aerobic exercise program is recommended to help achieve and maintain normal body weight, fitness and improve lipid balance. .  Dietary changes: Increase soluble fiber  Plant sterols 2grams per day (e.g. Benecol)  Reduce saturated fat, \"trans\" monounsaturated fatty acids, and cholesterol         Relevant Medications    valsartan-hydrochlorothiazide (Diovan-HCT) 160-12.5 mg tablet    Other Relevant Orders    Follow Up In [facility] - PCP - Established    Primary localized osteoarthrosis of multiple sites     Stable, continue current medications and management.         Relevant Orders "    Follow Up In [facility] - PCP - Established     OARRS:  Griffin Sharma MD on 12/23/2024 11:19 AM  I have personally reviewed the OARRS report for Terry Lee. I have considered the risks of abuse, dependence, addiction and diversion    Is the patient prescribed a combination of a benzodiazepine and opioid?  No    Last Urine Drug Screen / ordered today: No  Recent Results (from the past 8760 hours)   POCT waived urine drug screen manually resulted    Collection Time: 06/20/24 12:19 PM   Result Value Ref Range    POC THC Negative Negative, Not applicable ng/mL    POC Cocaine Negative Negative, Not applicable ng/mL    POC Opiates Negative Negative, Not applicable ng/mL    POC Amphetamine Negative Negative, Not applicable ng/mL    POC Phencyclidine (PCP) Negative Negative, Not applicable ng/mL    POC Barbiturates Negative Negative, Not applicable ng/mL    POC Benzodiazepines Negative Negative, Not applicable ng/mL    POC Methamphetamine Negative Negative, Not applicable ng/mL    POC METHADONE MANUALLY ENTERED Negative Negative, Not applicable ng/mL    POC Ticyclic Antidepressants (TCA) Negative Negative, Not applicable ng/mL    POC Oxycodone Negative Negative, Not applicable ng/mL    POC MDMA URINE Negative Negative, Not applicable ng/mL    POC Morphine Urine Negative Negative, Not applicable ng/mL    POC Burprenorphine Urine Negative Negative, Not applicable ng/mL   Zolpidem Confirmation, Urine    Collection Time: 06/20/24 12:17 PM   Result Value Ref Range    Zolpidem 44 (H) <25 ng/mL    Zolpidem Metabolite (ZCA) >1,000 (H) <25 ng/mL     Controlled Substance Agreement:  Date of the Last Agreement: 6/20/2024  Reviewed Controlled Substance Agreement including but not limited to the benefits, risks, and alternatives to treatment with a Controlled Substance medication(s).    Opioids:  What is the patient's goal of therapy? Pain control and to be able to walk and perform activities of daily living  without pain     Is this being achieved with current treatment? yes    I have calculated the patient's Morphine Dose Equivalent (MED):   I have considered referral to Pain Management and/or a specialist, and do not feel it is necessary at this time.    I feel that it is clinically indicated to continue this current medication regimen after consideration of alternative therapies, and other non-opioid treatment.    Opioid Risk Screening:  Family History of Substance Abuse  Alcohol: 0 (9/24/2024 10:00 AM)  Illegal Drugs: 0 (9/24/2024 10:00 AM)  Prescription Drugs: 0 (9/24/2024 10:00 AM)    Personal History of Substance Abuse  Alcohol: 0 (9/24/2024 10:00 AM)  Illegal Drugs: 0 (9/24/2024 10:00 AM)  Prescription Drugs: 0 (9/24/2024 10:00 AM)    Patient Age (16-45)  Age (16-45): 0 (9/24/2024 10:00 AM)    History of Preadolescent Sexual Abuse  History of Preadolescent Sexual Abuse: 0 (9/24/2024 10:00 AM)    Psychological Disease  Attention Deficit Disorder, Obsessive Compulsive Disorder, Bipolar, Schizophrenia: 0 (9/24/2024 10:00 AM)  Depression: 0 (9/24/2024 10:00 AM)    Total Score  Total Score: 0 (9/24/2024 10:00 AM)    Total Score Risk Category  TOTAL SCORE CATEGORY: Low Risk (0-3) (9/24/2024 10:00 AM)    and Sleep Aids:   What is the patient's goal of therapy? To be able to get uninterrupted sleep for 6-8 hours during the night without being unduly tired, dizzy or drowsy during the day    Is this being achieved with current treatment? yes    Activities of Daily Living:   Is your overall impression that this patient is benefiting (symptom reduction outweighs side effects) from sleep aid therapy? Yes     1. Physical Functioning: Better  2. Family Relationship: Better  3. Social Relationship: Better  4. Mood: Better  5. Sleep Patterns: Better  6. Overall Function: Better    Scribe Attestation  By signing my name below, Jairon VERNON Scribe   attest that this documentation has been prepared under the direction and in  the presence of Griffin Sharma MD.

## 2024-12-23 NOTE — PATIENT INSTRUCTIONS
BMI was above normal measurement. Current weight: 133 kg (294 lb)  Weight change since last visit (-) denotes wt loss 18 lbs   Weight loss needed to achieve BMI 25: 104.9 Lbs  Weight loss needed to achieve BMI 30: 67.1 Lbs  Advised to Increase physical activity.

## 2025-01-09 PROCEDURE — 93306 TTE W/DOPPLER COMPLETE: CPT | Performed by: INTERNAL MEDICINE

## 2025-01-23 ENCOUNTER — TELEPHONE (OUTPATIENT)
Dept: GASTROENTEROLOGY | Facility: CLINIC | Age: 65
End: 2025-01-23
Payer: MEDICARE

## 2025-01-23 NOTE — TELEPHONE ENCOUNTER
Dr. Lazaro attempted to scope patient in November; procedure was cancelled due to low heart rate and a cardiac clearance was requested. Patient's wife called in and left message stating she has obtained the clearance. Her name is Nelly and she is requesting a call back at [phone].  Thank you.

## 2025-01-23 NOTE — TELEPHONE ENCOUNTER
Records release faxed to  Dr. Montoya at [facility] [phone], re: cardiology reports and clearance to proceed with scheduling colonoscopy.

## 2025-01-24 ENCOUNTER — TELEPHONE (OUTPATIENT)
Dept: GASTROENTEROLOGY | Facility: CLINIC | Age: 65
End: 2025-01-24
Payer: MEDICARE

## 2025-01-24 DIAGNOSIS — Z86.0100 HISTORY OF COLON POLYPS: Primary | ICD-10-CM

## 2025-01-24 NOTE — TELEPHONE ENCOUNTER
Dr. Lazaro,   you attempted to scope patient in November; procedure was cancelled due to low heart rate and a cardiac clearance was requested. Patient's wife called in and left message stating she has obtained the clearance.      Records have been scanned into media.  Need order placed for colonoscopy to be scheduled.

## 2025-01-27 RX ORDER — POLYETHYLENE GLYCOL 3350, SODIUM CHLORIDE, SODIUM BICARBONATE, POTASSIUM CHLORIDE 420; 11.2; 5.72; 1.48 G/4L; G/4L; G/4L; G/4L
4000 POWDER, FOR SOLUTION ORAL ONCE
Qty: 4000 ML | Refills: 0 | Status: SHIPPED | OUTPATIENT
Start: 2025-01-27 | End: 2025-01-27

## 2025-01-27 NOTE — TELEPHONE ENCOUNTER
Order placed, please call to schedule.    Medication e-scripted to pharmacy[facility].    Cj Lazaro MD

## 2025-02-13 ENCOUNTER — CLINICAL SUPPORT (OUTPATIENT)
Dept: PREADMISSION TESTING | Facility: HOSPITAL | Age: 65
End: 2025-02-13
Payer: MEDICARE

## 2025-02-13 VITALS — BODY MASS INDEX: 38.97 KG/M2 | HEIGHT: 73 IN | WEIGHT: 294 LBS

## 2025-02-13 RX ORDER — FLECAINIDE ACETATE 50 MG/1
1 TABLET ORAL
COMMUNITY
Start: 2025-01-13

## 2025-02-13 NOTE — PREPROCEDURE INSTRUCTIONS

## 2025-02-26 ENCOUNTER — ANESTHESIA EVENT (OUTPATIENT)
Dept: GASTROENTEROLOGY | Facility: HOSPITAL | Age: 65
End: 2025-02-26
Payer: MEDICARE

## 2025-02-26 ENCOUNTER — HOSPITAL ENCOUNTER (OUTPATIENT)
Dept: GASTROENTEROLOGY | Facility: HOSPITAL | Age: 65
Discharge: HOME | End: 2025-02-26
Payer: MEDICARE

## 2025-02-26 ENCOUNTER — ANESTHESIA (OUTPATIENT)
Dept: GASTROENTEROLOGY | Facility: HOSPITAL | Age: 65
End: 2025-02-26
Payer: MEDICARE

## 2025-02-26 VITALS
TEMPERATURE: 96.8 F | OXYGEN SATURATION: 98 % | WEIGHT: 288.14 LBS | HEIGHT: 73 IN | BODY MASS INDEX: 38.19 KG/M2 | RESPIRATION RATE: 18 BRPM | DIASTOLIC BLOOD PRESSURE: 113 MMHG | HEART RATE: 85 BPM | SYSTOLIC BLOOD PRESSURE: 169 MMHG

## 2025-02-26 DIAGNOSIS — Z86.0100 HISTORY OF COLON POLYPS: ICD-10-CM

## 2025-02-26 PROCEDURE — 2500000001 HC RX 250 WO HCPCS SELF ADMINISTERED DRUGS (ALT 637 FOR MEDICARE OP): Performed by: INTERNAL MEDICINE

## 2025-02-26 PROCEDURE — 2500000004 HC RX 250 GENERAL PHARMACY W/ HCPCS (ALT 636 FOR OP/ED): Performed by: NURSE ANESTHETIST, CERTIFIED REGISTERED

## 2025-02-26 PROCEDURE — 3700000002 HC GENERAL ANESTHESIA TIME - EACH INCREMENTAL 1 MINUTE

## 2025-02-26 PROCEDURE — 7100000009 HC PHASE TWO TIME - INITIAL BASE CHARGE

## 2025-02-26 PROCEDURE — 45385 COLONOSCOPY W/LESION REMOVAL: CPT | Performed by: INTERNAL MEDICINE

## 2025-02-26 PROCEDURE — 3700000001 HC GENERAL ANESTHESIA TIME - INITIAL BASE CHARGE

## 2025-02-26 PROCEDURE — 7100000010 HC PHASE TWO TIME - EACH INCREMENTAL 1 MINUTE

## 2025-02-26 RX ORDER — GLYCOPYRROLATE 0.2 MG/ML
INJECTION INTRAMUSCULAR; INTRAVENOUS AS NEEDED
Status: DISCONTINUED | OUTPATIENT
Start: 2025-02-26 | End: 2025-02-26

## 2025-02-26 RX ORDER — PROPOFOL 10 MG/ML
INJECTION, EMULSION INTRAVENOUS AS NEEDED
Status: DISCONTINUED | OUTPATIENT
Start: 2025-02-26 | End: 2025-02-26

## 2025-02-26 RX ORDER — SODIUM CHLORIDE, SODIUM LACTATE, POTASSIUM CHLORIDE, CALCIUM CHLORIDE 600; 310; 30; 20 MG/100ML; MG/100ML; MG/100ML; MG/100ML
INJECTION, SOLUTION INTRAVENOUS CONTINUOUS PRN
Status: DISCONTINUED | OUTPATIENT
Start: 2025-02-26 | End: 2025-02-26

## 2025-02-26 RX ORDER — DEXTROMETHORPHAN/PSEUDOEPHED 2.5-7.5/.8
DROPS ORAL AS NEEDED
Status: COMPLETED | OUTPATIENT
Start: 2025-02-26 | End: 2025-02-26

## 2025-02-26 RX ADMIN — SODIUM CHLORIDE, POTASSIUM CHLORIDE, SODIUM LACTATE AND CALCIUM CHLORIDE: 600; 310; 30; 20 INJECTION, SOLUTION INTRAVENOUS at 10:48

## 2025-02-26 RX ADMIN — PROPOFOL 200 MG: 10 INJECTION, EMULSION INTRAVENOUS at 10:54

## 2025-02-26 RX ADMIN — PROPOFOL 200 MG: 10 INJECTION, EMULSION INTRAVENOUS at 10:59

## 2025-02-26 RX ADMIN — GLYCOPYRROLATE 0.6 MG: 0.2 INJECTION, SOLUTION INTRAMUSCULAR; INTRAVENOUS at 10:48

## 2025-02-26 RX ADMIN — SIMETHICONE 40 MG: 20 EMULSION ORAL at 10:58

## 2025-02-26 RX ADMIN — GLYCOPYRROLATE 0.4 MG: 0.2 INJECTION, SOLUTION INTRAMUSCULAR; INTRAVENOUS at 10:57

## 2025-02-26 ASSESSMENT — PAIN - FUNCTIONAL ASSESSMENT: PAIN_FUNCTIONAL_ASSESSMENT: 0-10

## 2025-02-26 ASSESSMENT — PAIN SCALES - GENERAL
PAINLEVEL_OUTOF10: 0 - NO PAIN
PAIN_LEVEL: 0
PAINLEVEL_OUTOF10: 0 - NO PAIN
PAINLEVEL_OUTOF10: 0 - NO PAIN

## 2025-02-26 ASSESSMENT — COLUMBIA-SUICIDE SEVERITY RATING SCALE - C-SSRS
6. HAVE YOU EVER DONE ANYTHING, STARTED TO DO ANYTHING, OR PREPARED TO DO ANYTHING TO END YOUR LIFE?: NO
1. IN THE PAST MONTH, HAVE YOU WISHED YOU WERE DEAD OR WISHED YOU COULD GO TO SLEEP AND NOT WAKE UP?: NO
2. HAVE YOU ACTUALLY HAD ANY THOUGHTS OF KILLING YOURSELF?: NO

## 2025-02-26 NOTE — ANESTHESIA POSTPROCEDURE EVALUATION
Patient: Terry Lee    Procedure Summary       Date: 02/26/25 Room / Location: [facility]    Anesthesia Start: 1048 Anesthesia Stop:     Procedure: COLONOSCOPY Diagnosis: History of colon polyps    Scheduled Providers: Cj Lazaro MD; Bharath Vazquez DO; Xuan Conklin; Francisca Ortiz RN Responsible Provider: Bharath Vazquez DO    Anesthesia Type: MAC ASA Status: 3            Anesthesia Type: MAC    Vitals Value Taken Time   /94 02/26/25 1116   Temp 36.5 02/26/25 1116   Pulse 76 02/26/25 1116   Resp 18 02/26/25 1116   SpO2 100 02/26/25 1116       Anesthesia Post Evaluation    Patient location during evaluation: bedside  Patient participation: complete - patient participated  Level of consciousness: awake and alert  Pain score: 0  Pain management: adequate  Airway patency: patent  Cardiovascular status: acceptable and stable  Respiratory status: acceptable and room air  Hydration status: acceptable  Postoperative Nausea and Vomiting: none      No notable events documented.

## 2025-02-26 NOTE — Clinical Note
Huddle and Timeout completed together with team. Patient wristband and THEODORE information verified.  Anesthesia safety check completed

## 2025-02-26 NOTE — ANESTHESIA PREPROCEDURE EVALUATION
Patient: Terry Lee    Procedure Information       Date/Time: 02/26/25 1100    Scheduled providers: Cj Lazaro MD; Bharath Vazquez DO; Xuan Conklin; Francisca Ortiz RN    Procedure: COLONOSCOPY    Location: [facility]            Relevant Problems   Cardiac   (+) Essential hypertension   (+) Other chest pain      Pulmonary   (+) SOB (shortness of breath) on exertion      Neuro   (+) Piriformis syndrome of both sides      GI   (+) Gastroesophageal reflux disease without esophagitis      /Renal   (+) Benign prostatic hyperplasia (BPH) with urinary urge incontinence   (+) Left nephrolithiasis      Liver   (+) Cholecystitis      Endocrine   (+) Class 2 severe obesity due to excess calories with serious comorbidity and body mass index (BMI) of 38.0 to 38.9 in adult      Musculoskeletal   (+) Chronic bilateral low back pain without sciatica   (+) Knee osteoarthritis   (+) Primary localized osteoarthrosis of multiple sites   (+) Primary osteoarthritis of both knees      HEENT   (+) Acute recurrent maxillary sinusitis   (+) Sinus congestion      ID   (+) Infected sebaceous cyst   (+) Onychomycosis of toenail       Clinical information reviewed:                   NPO Detail:  No data recorded     Physical Exam    Airway  Mallampati: II     Cardiovascular   Rhythm: regular  Rate: normal     Dental    Pulmonary - normal exam     Abdominal - normal exam             Anesthesia Plan    History of general anesthesia?: yes  History of complications of general anesthesia?: no    ASA 3     MAC     intravenous induction   Postoperative administration of opioids is intended.  Anesthetic plan and risks discussed with patient.

## 2025-02-26 NOTE — H&P
"Outpatient Hospital Procedure H&P    Patient Profile-Procedures  Initial Info  Patient Demographics  Name Terry Lee  Date of Birth 1960  MRN [id number]  Address   [address] [id number] [facility] [address]    Primary Phone Number [phone]  Secondary Phone Number    Griffin Rhoades    Procedure(s):  Colonoscopy    Primary contact name and number   Extended Emergency Contact Information  Primary Emergency Contact: Aspen Lee   [facility]  Home Phone: [phone]  Work Phone: [phone]  Relation: Spouse  Secondary Emergency Contact: hernánahrjit   [place]  Mobile Phone: [phone]  Relation: Son    General Health  Weight   Vitals:    02/26/25 0951   Weight: 131 kg (288 lb 2.3 oz)     BMI Body mass index is 38.02 kg/m².    Allergies  No Known Allergies    Past Medical History   Past Medical History:   Diagnosis Date    Arrhythmia     \"bradycardia with PVC's\"    Arthritis     Benign prostatic hyperplasia with lower urinary tract symptoms     Benign localized hyperplasia of prostate with urinary obstruction and lower urinary tract symptoms    COVID-19     VACCINATED    De Quervain's disease (tenosynovitis)     GERD (gastroesophageal reflux disease)     Hyperlipidemia     Hypertension     Insomnia     Intra-abdominal abscess (Multi) 02/15/2023    Joint pain     Nephrolithiasis     Personal history of other endocrine, nutritional and metabolic disease     History of vitamin D deficiency    Personal history of other specified conditions     History of gross hematuria    Sleep apnea     RESOLVED    Snores     Wears glasses        Provider assessment  Diagnosis: h/o polyps    Medication Reviewed - yes  Prior to Admission medications    Medication Sig Start Date End Date Taking? Authorizing Provider   acetaminophen (Tylenol) 325 mg tablet Take 1 tablet (325 mg) by mouth every 6 hours if needed. 6/14/16  Yes Historical " Provider, MD   ciclopirox (Penlac) 8 % solution Apply topically once daily at bedtime. 9/24/24  Yes Griffin Sharma MD   cyanocobalamin, vitamin B-12, 1,000 mcg tablet, sublingual Place under the tongue.   Yes Historical Provider, MD   famotidine (Pepcid) 20 mg tablet Take 1 tablet (20 mg) by mouth once daily. 9/24/24  Yes Griffin Sharma MD   finasteride (Proscar) 5 mg tablet Take 1 tablet (5 mg) by mouth once daily. 9/24/24  Yes Griffin Sharma MD   flecainide (Tambocor) 50 mg tablet Take 1 tablet (50 mg) by mouth every 12 hours. 1/13/25  Yes Historical Provider, MD   HYDROcodone-acetaminophen (Norco) 5-325 mg tablet Take 1 tablet by mouth every 6 hours if needed for severe pain (7 - 10) (for 30 days). 12/23/24  Yes Griffin Sharma MD   multivit with minerals/lutein (MULTIVITAMIN 50 PLUS ORAL) Take 1 tablet by mouth once daily.   Yes Historical Provider, MD   tamsulosin (Flomax) 0.4 mg 24 hr capsule Take 1 capsule (0.4 mg) by mouth once daily. 9/24/24  Yes Griffin Sharma MD   valsartan-hydrochlorothiazide (Diovan-HCT) 160-12.5 mg tablet Take 1 tablet by mouth once daily. 12/23/24  Yes Griffin Sharma MD   zolpidem (Ambien) 10 mg tablet Take 1 tablet (10 mg) by mouth as needed at bedtime for sleep. 12/23/24  Yes Griffin Sharma MD   naloxone (Narcan) 4 mg/0.1 mL nasal spray Use as directed 9/28/20   Historical Provider, MD   tadalafil (Cialis) 5 mg tablet Take 1 tablet (5 mg) by mouth once daily. 12/23/24   Griffin Sharma MD       Physical Exam  Vitals:    02/26/25 0951   BP: (!) 158/92   Pulse: 61   Resp: 18   Temp: 36.2 °C (97.2 °F)   SpO2: 96%        General: A&Ox3, NAD.  CV: RRR. No murmur.  Resp: CTA bilaterally. No wheezing, rhonchi or rales.   Extrem: No edema.       Oropharyngeal Classification III (soft and hard palate and base of uvula visible)  ASA PS Classification 3  Sedation Plan Deep  Procedure Plan - pre-procedural (re)assesment completed by physician:  discharge/transfer patient when  discharge criteria met    Cj Lazaro MD  2/26/2025 10:16 AM

## 2025-02-26 NOTE — Clinical Note
Patient tolerated procedure well. Appears comfortable with no complaints of pain. VS stable. Arousable prior to transport. Patient transported to [facility] via cart.  Report called    per crna          . Handoff completed

## 2025-03-05 LAB
LABORATORY COMMENT REPORT: NORMAL
PATH REPORT.FINAL DX SPEC: NORMAL
PATH REPORT.GROSS SPEC: NORMAL
PATH REPORT.RELEVANT HX SPEC: NORMAL
PATH REPORT.TOTAL CANCER: NORMAL

## 2025-03-21 LAB — PSA SERPL-MCNC: 0.79 NG/ML

## 2025-03-25 ENCOUNTER — APPOINTMENT (OUTPATIENT)
Dept: PRIMARY CARE | Facility: CLINIC | Age: 65
End: 2025-03-25
Payer: COMMERCIAL

## 2025-03-25 ENCOUNTER — TELEPHONE (OUTPATIENT)
Dept: PRIMARY CARE | Facility: CLINIC | Age: 65
End: 2025-03-25

## 2025-03-25 VITALS
HEART RATE: 69 BPM | BODY MASS INDEX: 39.71 KG/M2 | RESPIRATION RATE: 18 BRPM | OXYGEN SATURATION: 95 % | WEIGHT: 299.6 LBS | SYSTOLIC BLOOD PRESSURE: 132 MMHG | TEMPERATURE: 98 F | DIASTOLIC BLOOD PRESSURE: 88 MMHG | HEIGHT: 73 IN

## 2025-03-25 DIAGNOSIS — N52.9 ERECTILE DYSFUNCTION, UNSPECIFIED ERECTILE DYSFUNCTION TYPE: ICD-10-CM

## 2025-03-25 DIAGNOSIS — K21.9 GASTROESOPHAGEAL REFLUX DISEASE WITHOUT ESOPHAGITIS: ICD-10-CM

## 2025-03-25 DIAGNOSIS — E66.812 CLASS 2 SEVERE OBESITY DUE TO EXCESS CALORIES WITH SERIOUS COMORBIDITY AND BODY MASS INDEX (BMI) OF 39.0 TO 39.9 IN ADULT: ICD-10-CM

## 2025-03-25 DIAGNOSIS — E66.01 CLASS 2 SEVERE OBESITY DUE TO EXCESS CALORIES WITH SERIOUS COMORBIDITY AND BODY MASS INDEX (BMI) OF 39.0 TO 39.9 IN ADULT: ICD-10-CM

## 2025-03-25 DIAGNOSIS — I10 ESSENTIAL HYPERTENSION: ICD-10-CM

## 2025-03-25 DIAGNOSIS — N39.41 BENIGN PROSTATIC HYPERPLASIA (BPH) WITH URINARY URGE INCONTINENCE: ICD-10-CM

## 2025-03-25 DIAGNOSIS — F51.04 CHRONIC INSOMNIA: ICD-10-CM

## 2025-03-25 DIAGNOSIS — Z00.00 ROUTINE GENERAL MEDICAL EXAMINATION AT A HEALTH CARE FACILITY: Primary | ICD-10-CM

## 2025-03-25 DIAGNOSIS — M54.50 CHRONIC BILATERAL LOW BACK PAIN WITHOUT SCIATICA: ICD-10-CM

## 2025-03-25 DIAGNOSIS — M19.91 PRIMARY LOCALIZED OSTEOARTHROSIS OF MULTIPLE SITES: ICD-10-CM

## 2025-03-25 DIAGNOSIS — N40.1 BENIGN PROSTATIC HYPERPLASIA (BPH) WITH URINARY URGE INCONTINENCE: ICD-10-CM

## 2025-03-25 DIAGNOSIS — G89.29 CHRONIC BILATERAL LOW BACK PAIN WITHOUT SCIATICA: ICD-10-CM

## 2025-03-25 PROCEDURE — 3074F SYST BP LT 130 MM HG: CPT | Performed by: FAMILY MEDICINE

## 2025-03-25 PROCEDURE — 1036F TOBACCO NON-USER: CPT | Performed by: FAMILY MEDICINE

## 2025-03-25 PROCEDURE — 3079F DIAST BP 80-89 MM HG: CPT | Performed by: FAMILY MEDICINE

## 2025-03-25 PROCEDURE — 1160F RVW MEDS BY RX/DR IN RCRD: CPT | Performed by: FAMILY MEDICINE

## 2025-03-25 PROCEDURE — 99213 OFFICE O/P EST LOW 20 MIN: CPT | Performed by: FAMILY MEDICINE

## 2025-03-25 PROCEDURE — 1123F ACP DISCUSS/DSCN MKR DOCD: CPT | Performed by: FAMILY MEDICINE

## 2025-03-25 PROCEDURE — 1170F FXNL STATUS ASSESSED: CPT | Performed by: FAMILY MEDICINE

## 2025-03-25 PROCEDURE — 1159F MED LIST DOCD IN RCRD: CPT | Performed by: FAMILY MEDICINE

## 2025-03-25 PROCEDURE — G0439 PPPS, SUBSEQ VISIT: HCPCS | Performed by: FAMILY MEDICINE

## 2025-03-25 PROCEDURE — 3008F BODY MASS INDEX DOCD: CPT | Performed by: FAMILY MEDICINE

## 2025-03-25 RX ORDER — FAMOTIDINE 20 MG/1
20 TABLET, FILM COATED ORAL DAILY
Qty: 90 TABLET | Refills: 3 | Status: SHIPPED | OUTPATIENT
Start: 2025-03-25

## 2025-03-25 RX ORDER — VALSARTAN AND HYDROCHLOROTHIAZIDE 160; 12.5 MG/1; MG/1
1 TABLET, FILM COATED ORAL DAILY
Qty: 90 TABLET | Refills: 1 | Status: SHIPPED | OUTPATIENT
Start: 2025-03-25

## 2025-03-25 RX ORDER — HYDROCODONE BITARTRATE AND ACETAMINOPHEN 5; 325 MG/1; MG/1
1 TABLET ORAL EVERY 6 HOURS PRN
Qty: 100 TABLET | Refills: 0 | Status: SHIPPED | OUTPATIENT
Start: 2025-03-25

## 2025-03-25 RX ORDER — TADALAFIL 5 MG/1
5 TABLET ORAL DAILY
Qty: 90 TABLET | Refills: 1 | Status: SHIPPED | OUTPATIENT
Start: 2025-03-25

## 2025-03-25 RX ORDER — FINASTERIDE 5 MG/1
5 TABLET, FILM COATED ORAL DAILY
Qty: 90 TABLET | Refills: 3 | Status: SHIPPED | OUTPATIENT
Start: 2025-03-25

## 2025-03-25 RX ORDER — TADALAFIL 5 MG/1
5 TABLET ORAL DAILY
Qty: 90 TABLET | Refills: 1 | Status: SHIPPED | OUTPATIENT
Start: 2025-03-25 | End: 2025-03-25 | Stop reason: SDUPTHER

## 2025-03-25 RX ORDER — TAMSULOSIN HYDROCHLORIDE 0.4 MG/1
0.4 CAPSULE ORAL DAILY
Qty: 90 CAPSULE | Refills: 1 | Status: SHIPPED | OUTPATIENT
Start: 2025-03-25

## 2025-03-25 ASSESSMENT — ENCOUNTER SYMPTOMS
COUGH: 0
CHILLS: 0
BACK PAIN: 1
CONSTIPATION: 0
DIZZINESS: 0
SHORTNESS OF BREATH: 0
INSOMNIA: 1
DIARRHEA: 0
POLYPHAGIA: 0
RHINORRHEA: 0
SORE THROAT: 0
NUMBNESS: 0
VOMITING: 0
POLYDIPSIA: 0
TREMORS: 0
FREQUENCY: 0
HEADACHES: 0
DYSURIA: 0
PALPITATIONS: 0
WHEEZING: 0
WEAKNESS: 0
FEVER: 0
ABDOMINAL PAIN: 0
HEMATURIA: 0

## 2025-03-25 ASSESSMENT — ACTIVITIES OF DAILY LIVING (ADL)
MANAGING_FINANCES: INDEPENDENT
BATHING: INDEPENDENT
TAKING_MEDICATION: INDEPENDENT
GROCERY_SHOPPING: INDEPENDENT

## 2025-03-25 ASSESSMENT — LIFESTYLE VARIABLES: TOTAL SCORE: 0

## 2025-03-25 NOTE — PATIENT INSTRUCTIONS
BMI was above normal measurement. Current weight: 136 kg (299 lb 9.6 oz)  Weight change since last visit (-) denotes wt loss 11.46 lbs   Weight loss needed to achieve BMI 25: 110.5 Lbs  Weight loss needed to achieve BMI 30: 72.7 Lbs  Advised to Increase physical activity.

## 2025-03-25 NOTE — TELEPHONE ENCOUNTER
Patient in office stating that his medication tadalafil 5mg tablets need to be sent to [facility].     Please advise

## 2025-03-25 NOTE — TELEPHONE ENCOUNTER
We received a request for prior authorization on the patient's Cialis 5 mg from their pharmacy. Prior authorization was submitted to insurance today. We will await their determination.

## 2025-03-25 NOTE — TELEPHONE ENCOUNTER
PA approved  Request Reference Number: [id number]. TADALAFIL TAB 5MG is approved through 12/31/2025.

## 2025-03-25 NOTE — ASSESSMENT & PLAN NOTE
"Well-controlled, continue current medications and management. Dietary sodium restriction.  Regular aerobic exercise program is recommended to help achieve and maintain normal body weight, fitness and improve lipid balance. .  Dietary changes: Increase soluble fiber  Plant sterols 2grams per day (e.g. Benecol)  Reduce saturated fat, \"trans\" monounsaturated fatty acids, and cholesterol  "

## 2025-03-25 NOTE — PROGRESS NOTES
Subjective   Patient ID: Terry Lee is a 64 y.o. male who presents for Medicare Annual Wellness Visit Subsequent and Follow-up (Hypertension, Chronic Back Pain, Chronic Insomnia, and Osteoarthritis).    He presents for Annual Medicare Wellness Visit and follow-up on hypertension. Cardiac symptoms: none. He has no chest pain, dyspnea, exertional chest pressure/discomfort, near-syncope, orthopnea, palpitations, paroxysmal nocturnal dyspnea, and syncope. Use of agents associated with hypertension: none. History of target organ damage: none.    Back Pain  This is a chronic problem. The current episode started more than 1 year ago. The problem occurs daily. The problem has been resolved since onset. The pain is present in the lumbar spine. The quality of the pain is described as aching. The patient is experiencing no pain. Pertinent negatives include no abdominal pain, chest pain, dysuria, fever, headaches, numbness or weakness.   Insomnia  This is a chronic problem. The current episode started more than 1 year ago. The problem occurs daily. The problem has been gradually improving. Pertinent negatives include no abdominal pain, chest pain, chills, congestion, coughing, fever, headaches, numbness, sore throat, vomiting or weakness. Nothing aggravates the symptoms. The treatment provided moderate relief.     Past Medical, Surgical, and Family History reviewed and updated in chart.    Reviewed all medications by prescribing practitioner or clinical pharmacist (such as prescriptions, OTCs, herbal therapies and supplements) and documented in the medical record.    Patient Self Assessment of Health Status  Patient Self Assessment: Good    Nutrition and Exercise  Current Diet: Unhealthy Diet, Well Balanced Diet, Other Prescribed Diet  Adequate Fluid Intake: Yes  Caffeine: Yes  Exercise Frequency: Infrequently    Functional Ability/Level of Safety  Cognitive Impairment Observed: No cognitive impairment  "observed  Cognitive Impairment Reported: No cognitive impairment reported by patient or family    Home Safety Risk Factors: None    Review of Systems   Constitutional:  Negative for chills and fever.   HENT:  Negative for congestion, ear pain, nosebleeds, rhinorrhea and sore throat.    Respiratory:  Negative for cough, shortness of breath and wheezing.    Cardiovascular:  Negative for chest pain, palpitations and leg swelling.   Gastrointestinal:  Negative for abdominal pain, constipation, diarrhea and vomiting.   Endocrine: Negative for cold intolerance, heat intolerance, polydipsia, polyphagia and polyuria.   Genitourinary:  Negative for dysuria, frequency and hematuria.   Musculoskeletal:  Positive for back pain.   Neurological:  Negative for dizziness, tremors, weakness, numbness and headaches.   Psychiatric/Behavioral:  The patient has insomnia.        Objective   /88   Pulse 69   Temp 36.7 °C (98 °F)   Resp 18   Ht 1.854 m (6' 1\")   Wt 136 kg (299 lb 9.6 oz)   SpO2 95%   BMI 39.53 kg/m²     Physical Exam  Constitutional:       General: He is not in acute distress.     Appearance: Normal appearance.   HENT:      Head: Normocephalic and atraumatic.      Mouth/Throat:      Mouth: Mucous membranes are moist.      Pharynx: Oropharynx is clear. No oropharyngeal exudate or posterior oropharyngeal erythema.   Eyes:      General: No scleral icterus.     Extraocular Movements: Extraocular movements intact.      Pupils: Pupils are equal, round, and reactive to light.   Cardiovascular:      Rate and Rhythm: Normal rate and regular rhythm.      Pulses: Normal pulses.      Heart sounds: No murmur heard.     No friction rub. No gallop.   Pulmonary:      Effort: Pulmonary effort is normal.      Breath sounds: No wheezing, rhonchi or rales.   Musculoskeletal:      Right lower leg: No edema.      Left lower leg: No edema.   Skin:     General: Skin is warm.      Coloration: Skin is not jaundiced or pale.      " Findings: No erythema or rash.   Neurological:      General: No focal deficit present.      Mental Status: He is alert and oriented to person, place, and time.      Cranial Nerves: No cranial nerve deficit.      Sensory: No sensory deficit.      Coordination: Coordination normal.      Gait: Gait normal.         Assessment/Plan   Problem List Items Addressed This Visit       Benign prostatic hyperplasia (BPH) with urinary urge incontinence     Stable, continue current medications and management.         Relevant Medications    finasteride (Proscar) 5 mg tablet    tamsulosin (Flomax) 0.4 mg 24 hr capsule    Chronic bilateral low back pain without sciatica     Stable, continue current medications and management.  Natural history and expected course discussed. Questions answered.  Educational material distributed.  Proper lifting, bending technique discussed.  Stretching exercises discussed.  Regular aerobic and trunk strengthening exercises discussed.  Ice to affected area as needed for local pain relief.  Heat to affected area as needed for local pain relief.         Relevant Medications    HYDROcodone-acetaminophen (Norco) 5-325 mg tablet    Other Relevant Orders    Follow Up In [facility] - PCP    Chronic insomnia     We will have him try OTC Unisom.         Relevant Orders    Follow Up In [facility] - PCP    Class 2 severe obesity due to excess calories with serious comorbidity and body mass index (BMI) of 39.0 to 39.9 in adult     Continue decrease calorie diet and not more than 1500 calorie per day diet and low-fat diet.  Continue with regular exercise program.  We advised exercise at least 5 days a week for at least 45 minutes and also a minimum of 10,000 steps a day.  The detrimental effects of obesity on health were discussed.         Erectile dysfunction     Continue current medications and management.         Relevant Medications    tadalafil (Cialis) 5 mg tablet    Essential  "hypertension     Well-controlled, continue current medications and management. Dietary sodium restriction.  Regular aerobic exercise program is recommended to help achieve and maintain normal body weight, fitness and improve lipid balance. .  Dietary changes: Increase soluble fiber  Plant sterols 2grams per day (e.g. Benecol)  Reduce saturated fat, \"trans\" monounsaturated fatty acids, and cholesterol         Relevant Medications    valsartan-hydrochlorothiazide (Diovan-HCT) 160-12.5 mg tablet    Other Relevant Orders    Follow Up In [facility] - PCP    Gastroesophageal reflux disease without esophagitis     Well-controlled, continue current medications and management.         Relevant Medications    famotidine (Pepcid) 20 mg tablet    Primary localized osteoarthrosis of multiple sites     Stable, continue current medications and management.         Relevant Orders    Follow Up In [facility] - PCP    Routine general medical examination at a health care facility - Primary     Recommend low-cholesterol diet, low-fat diet and low-salt diet.  The need for lifelong dietary compliance in order to reduce cardiac risk is recommended.  We will also recommend regular exercise program to improve lipid balance and overall health.  Recommend decreasing fat and cholesterol in diet, increasing aerobic exercise with a goal of 4 or more days per week          OARRS:  Griffin Sharma MD on 3/25/2025  9:19 AM  I have personally reviewed the OARRS report for Terry Lee. I have considered the risks of abuse, dependence, addiction and diversion    Is the patient prescribed a combination of a benzodiazepine and opioid?  No    Last Urine Drug Screen / ordered today: No  Recent Results (from the past 8760 hours)   POCT waived urine drug screen manually resulted    Collection Time: 06/20/24 12:19 PM   Result Value Ref Range    POC THC Negative Negative, Not applicable ng/mL    POC Cocaine Negative Negative, Not " applicable ng/mL    POC Opiates Negative Negative, Not applicable ng/mL    POC Amphetamine Negative Negative, Not applicable ng/mL    POC Phencyclidine (PCP) Negative Negative, Not applicable ng/mL    POC Barbiturates Negative Negative, Not applicable ng/mL    POC Benzodiazepines Negative Negative, Not applicable ng/mL    POC Methamphetamine Negative Negative, Not applicable ng/mL    POC METHADONE MANUALLY ENTERED Negative Negative, Not applicable ng/mL    POC Ticyclic Antidepressants (TCA) Negative Negative, Not applicable ng/mL    POC Oxycodone Negative Negative, Not applicable ng/mL    POC MDMA URINE Negative Negative, Not applicable ng/mL    POC Morphine Urine Negative Negative, Not applicable ng/mL    POC Burprenorphine Urine Negative Negative, Not applicable ng/mL   Zolpidem Confirmation, Urine    Collection Time: 06/20/24 12:17 PM   Result Value Ref Range    Zolpidem 44 (H) <25 ng/mL    Zolpidem Metabolite (ZCA) >1,000 (H) <25 ng/mL     Controlled Substance Agreement:  Date of the Last Agreement: 3/25/25  Reviewed Controlled Substance Agreement including but not limited to the benefits, risks, and alternatives to treatment with a Controlled Substance medication(s).    Opioids:  What is the patient's goal of therapy? Pain control and to be able to walk and perform activities of daily living without pain     Is this being achieved with current treatment? YES    I have calculated the patient's Morphine Dose Equivalent (MED):   I have considered referral to Pain Management and/or a specialist, and do not feel it is necessary at this time.    I feel that it is clinically indicated to continue this current medication regimen after consideration of alternative therapies, and other non-opioid treatment.    Opioid Risk Screening:  Family History of Substance Abuse  Alcohol: 0 (3/25/2025  9:00 AM)  Illegal Drugs: 0 (3/25/2025  9:00 AM)  Prescription Drugs: 0 (3/25/2025  9:00 AM)    Personal History of Substance  Abuse  Alcohol: 0 (3/25/2025  9:00 AM)  Illegal Drugs: 0 (3/25/2025  9:00 AM)  Prescription Drugs: 0 (3/25/2025  9:00 AM)    Patient Age (16-45)  Age (16-45): 0 (3/25/2025  9:00 AM)    History of Preadolescent Sexual Abuse  History of Preadolescent Sexual Abuse: 0 (3/25/2025  9:00 AM)    Psychological Disease  Attention Deficit Disorder, Obsessive Compulsive Disorder, Bipolar, Schizophrenia: 0 (3/25/2025  9:00 AM)  Depression: 0 (3/25/2025  9:00 AM)    Total Score  Total Score: 0 (3/25/2025  9:00 AM)    Total Score Risk Category  TOTAL SCORE CATEGORY: Low Risk (0-3) (3/25/2025  9:00 AM)    Pain Assessment:  Analgesia  What was your pain level on average during the past week?: 0 - No pain  What was your pain level at its worst during the past week?: 0 - No pain  What percentage of your pain has been relieved during the past week?: 0 %  Is the amount of pain relief you are now obtaining from your current pain relievers enough to make a real difference in your life?: N  Query to Clinician: Is the patient's pain relief clinically significant?: Unsure    Activities of Daily Living  Physical Functioning: Better  Family Relationships: Better  Social Relationships: Better  Mood: Better  Sleep Patterns: Better  Overall Functioning: Better    Adverse Events  Is patient experiencing any side effects from current pain relievers?: N  Patient's Overall Severity of Side Effects: None      Assessment  Is your overall impression that this patient is benefiting from opioid therapy?: Yes  Specific Analgesic Plan: Continue present regimen    Scribe Attestation  By signing my name below, IJairon, Scrapurva   attest that this documentation has been prepared under the direction and in the presence of Griffin Sharma MD.

## 2025-06-25 ENCOUNTER — APPOINTMENT (OUTPATIENT)
Dept: PRIMARY CARE | Facility: CLINIC | Age: 65
End: 2025-06-25
Payer: MEDICARE

## 2025-06-25 VITALS
HEIGHT: 73 IN | DIASTOLIC BLOOD PRESSURE: 92 MMHG | HEART RATE: 58 BPM | RESPIRATION RATE: 18 BRPM | OXYGEN SATURATION: 98 % | SYSTOLIC BLOOD PRESSURE: 158 MMHG | BODY MASS INDEX: 39.57 KG/M2 | TEMPERATURE: 97.5 F | WEIGHT: 298.6 LBS

## 2025-06-25 DIAGNOSIS — M54.50 CHRONIC BILATERAL LOW BACK PAIN WITHOUT SCIATICA: ICD-10-CM

## 2025-06-25 DIAGNOSIS — E78.5 DYSLIPIDEMIA: ICD-10-CM

## 2025-06-25 DIAGNOSIS — J30.9 CHRONIC ALLERGIC RHINITIS: ICD-10-CM

## 2025-06-25 DIAGNOSIS — R60.0 BILATERAL LEG EDEMA: ICD-10-CM

## 2025-06-25 DIAGNOSIS — M19.91 PRIMARY LOCALIZED OSTEOARTHROSIS OF MULTIPLE SITES: ICD-10-CM

## 2025-06-25 DIAGNOSIS — I10 ESSENTIAL HYPERTENSION: Primary | ICD-10-CM

## 2025-06-25 DIAGNOSIS — F51.04 CHRONIC INSOMNIA: ICD-10-CM

## 2025-06-25 DIAGNOSIS — Z12.5 SCREENING FOR MALIGNANT NEOPLASM OF PROSTATE: ICD-10-CM

## 2025-06-25 DIAGNOSIS — Z79.899 MEDICATION MANAGEMENT: ICD-10-CM

## 2025-06-25 DIAGNOSIS — G89.29 CHRONIC BILATERAL LOW BACK PAIN WITHOUT SCIATICA: ICD-10-CM

## 2025-06-25 PROCEDURE — 1036F TOBACCO NON-USER: CPT | Performed by: FAMILY MEDICINE

## 2025-06-25 PROCEDURE — 80305 DRUG TEST PRSMV DIR OPT OBS: CPT | Performed by: FAMILY MEDICINE

## 2025-06-25 PROCEDURE — 3008F BODY MASS INDEX DOCD: CPT | Performed by: FAMILY MEDICINE

## 2025-06-25 PROCEDURE — 3077F SYST BP >= 140 MM HG: CPT | Performed by: FAMILY MEDICINE

## 2025-06-25 PROCEDURE — 1159F MED LIST DOCD IN RCRD: CPT | Performed by: FAMILY MEDICINE

## 2025-06-25 PROCEDURE — 1160F RVW MEDS BY RX/DR IN RCRD: CPT | Performed by: FAMILY MEDICINE

## 2025-06-25 PROCEDURE — G2211 COMPLEX E/M VISIT ADD ON: HCPCS | Performed by: FAMILY MEDICINE

## 2025-06-25 PROCEDURE — 3079F DIAST BP 80-89 MM HG: CPT | Performed by: FAMILY MEDICINE

## 2025-06-25 PROCEDURE — 99214 OFFICE O/P EST MOD 30 MIN: CPT | Performed by: FAMILY MEDICINE

## 2025-06-25 RX ORDER — POTASSIUM CHLORIDE 750 MG/1
10 TABLET, FILM COATED, EXTENDED RELEASE ORAL DAILY
Qty: 90 TABLET | Refills: 0 | Status: SHIPPED | OUTPATIENT
Start: 2025-06-25

## 2025-06-25 RX ORDER — HYDROCODONE BITARTRATE AND ACETAMINOPHEN 5; 325 MG/1; MG/1
1 TABLET ORAL EVERY 6 HOURS PRN
Qty: 100 TABLET | Refills: 0 | Status: SHIPPED | OUTPATIENT
Start: 2025-06-25

## 2025-06-25 RX ORDER — FUROSEMIDE 40 MG/1
40 TABLET ORAL DAILY
Qty: 90 TABLET | Refills: 0 | Status: SHIPPED | OUTPATIENT
Start: 2025-06-25

## 2025-06-25 ASSESSMENT — ENCOUNTER SYMPTOMS
HEADACHES: 0
VOMITING: 0
POLYDIPSIA: 0
TREMORS: 0
BACK PAIN: 1
DIARRHEA: 0
BRUISES/BLEEDS EASILY: 0
FEVER: 0
WHEEZING: 0
PALPITATIONS: 0
DIZZINESS: 0
NUMBNESS: 0
POLYPHAGIA: 0
RHINORRHEA: 0
ADENOPATHY: 0
ABDOMINAL PAIN: 0
SHORTNESS OF BREATH: 0
DYSURIA: 0
SORE THROAT: 0
COUGH: 0
HEMATURIA: 0
FREQUENCY: 0
CHILLS: 0
CONSTIPATION: 0

## 2025-06-25 ASSESSMENT — LIFESTYLE VARIABLES: TOTAL SCORE: 0

## 2025-06-25 NOTE — PROGRESS NOTES
Subjective   Patient ID: Terry Lee is a 64 y.o. male who presents for Follow-up (Hypertension, back pain,insomnia, osteoarthritis ).    Patient is here for follow-up on hypertension and dyslipidemia. Cardiac symptoms: none. He has no chest pain, dyspnea, exertional chest pressure/discomfort, near-syncope, orthopnea, palpitations, paroxysmal nocturnal dyspnea, and syncope. Use of agents associated with hypertension: none. History of target organ damage: none.    Patient presents today to follow up on Osteoarthritis.  History of pain involving the multiple joints.  The pain has been present for the past several years.   Since the onset of the pain, the symptoms show no change.      Back Pain  This is a chronic problem. The current episode started more than 1 year ago. The problem occurs daily. The problem is unchanged. The pain is present in the lumbar spine. The quality of the pain is described as aching. The patient is experiencing no pain. Pertinent negatives include no abdominal pain, chest pain, dysuria, fever, headaches or numbness.   Edema    Presents with recurrent edema. The onset of the episode was gradual. These episodes happen throughout the day. The problem presents itself occasionally. The problem has been waxing and waning. The edema is present on the both side(s). Risk factors for edema include no known risk factors.     Pertinent negative symptoms include no abdominal pain, no chest pain, no cough, no fever, no palpitations and no vomiting.   Treatments tried include elevating limb(s). There has been moderate improvement on treatment(s).        Review of Systems   Constitutional:  Negative for chills and fever.   HENT:  Negative for congestion, ear pain, nosebleeds, rhinorrhea and sore throat.    Respiratory:  Negative for cough, shortness of breath and wheezing.    Cardiovascular:  Positive for leg swelling. Negative for chest pain and palpitations.   Gastrointestinal:  Negative for abdominal  "pain, constipation, diarrhea and vomiting.   Endocrine: Negative for cold intolerance, heat intolerance, polydipsia, polyphagia and polyuria.   Genitourinary:  Negative for dysuria, frequency and hematuria.   Musculoskeletal:  Positive for back pain.   Neurological:  Negative for dizziness, tremors, numbness and headaches.   Hematological:  Negative for adenopathy. Does not bruise/bleed easily.       Objective   BP (!) 158/92   Pulse 58   Temp 36.4 °C (97.5 °F)   Resp 18   Ht 1.854 m (6' 1\")   Wt 135 kg (298 lb 9.6 oz)   SpO2 98%   BMI 39.40 kg/m²     Physical Exam  Constitutional:       General: He is not in acute distress.     Appearance: Normal appearance.   HENT:      Head: Normocephalic and atraumatic.      Mouth/Throat:      Mouth: Mucous membranes are moist.      Pharynx: Oropharynx is clear. No oropharyngeal exudate or posterior oropharyngeal erythema.   Eyes:      General: No scleral icterus.     Extraocular Movements: Extraocular movements intact.      Pupils: Pupils are equal, round, and reactive to light.   Cardiovascular:      Rate and Rhythm: Normal rate and regular rhythm.      Pulses: Normal pulses.      Heart sounds: No murmur heard.     No friction rub. No gallop.   Pulmonary:      Effort: Pulmonary effort is normal.      Breath sounds: No wheezing, rhonchi or rales.   Musculoskeletal:      Cervical back: Normal range of motion and neck supple.      Right lower leg: Edema present.      Left lower leg: Edema present.   Skin:     General: Skin is warm.      Coloration: Skin is not jaundiced or pale.      Findings: No erythema or rash.   Neurological:      General: No focal deficit present.      Mental Status: He is alert and oriented to person, place, and time.      Cranial Nerves: No cranial nerve deficit.      Sensory: No sensory deficit.      Coordination: Coordination normal.      Gait: Gait normal.         Lab Results   Component Value Date    WBC 5.7 09/23/2024    HGB 14.1 09/23/2024    " HCT 43.5 09/23/2024     09/23/2024    CHOL 230 (H) 09/23/2024    TRIG 89 09/23/2024    .3 09/23/2024    ALT 22 09/23/2024    AST 21 09/23/2024     09/23/2024    K 4.9 09/23/2024     09/23/2024    CREATININE 0.78 09/23/2024    BUN 18 09/23/2024    CO2 33 (H) 09/23/2024    PSA 0.79 03/21/2025    INR 1.2 (H) 02/21/2023     Assessment/Plan   Problem List Items Addressed This Visit       Bilateral leg edema    Relevant Medications    furosemide (Lasix) 40 mg tablet    potassium chloride CR (Klor-Con) 10 mEq ER tablet    Chronic allergic rhinitis    Patient will Discontinue Zytrec due to ineffectiveness.  Patient will start on Claritin.          Chronic bilateral low back pain without sciatica    Stable based on symptoms. Continue established treatment plan.  Natural history and expected course discussed. Questions answered.  Educational material distributed.  Proper lifting, bending technique discussed.  Stretching exercises discussed.  Regular aerobic and trunk strengthening exercises discussed.  Ice to affected area as needed for local pain relief.  Heat to affected area as needed for local pain relief.           Relevant Medications    HYDROcodone-acetaminophen (Norco) 5-325 mg tablet    Other Relevant Orders    POCT waived urine drug screen manually resulted (Completed)    Follow Up In [facility] - PCP    Chronic insomnia    Stable . Continue established treatment plan.           Relevant Orders    Follow Up In [facility] - PCP    Dyslipidemia    Orders have been placed for fasting Lipid to be drawn in three months as ordered for further evaluation.   The nature of cardiac risk has been fully discussed with this patient. Discussed cardiovascular risk analysis and appropriate diet with the need for lifelong measures to reduce the risk. A regular exercise program is recommended to help achieve and maintain normal body weight, fitness and improve lipid balance. Patient  "education provided. They understand and agree with this course of treatment. They will return with new or worsening symptoms. Patient instructed to remain current with appropriate annual health maintenance.              Relevant Orders    Follow Up In [facility] - PCP    Lipid Panel    Essential hypertension - Primary    Worsening based on symptoms and exam. Treatment plan established. Disease monitoring and precautions, any treatment changes, patient instructions and follow-up are documented in encounter note.   Dietary sodium restriction.  Regular aerobic exercise program is recommended to help achieve and maintain normal body weight, fitness and improve lipid balance. .  Dietary changes: Increase soluble fiber  Plant sterols 2grams per day (e.g. Benecol)  Reduce saturated fat, \"trans\" monounsaturated fatty acids, and cholesterol  Orders have been placed for fasting CBC with diff and Comprehensive metabolic panel to be drawn in three months as ordered for further evaluation.            Relevant Orders    Follow Up In [facility] - PCP    CBC and Auto Differential    Comprehensive Metabolic Panel    Primary localized osteoarthrosis of multiple sites    Stable based on symptoms. Continue established treatment plan.           Relevant Orders    Follow Up In [facility] - PCP     Other Visit Diagnoses         Medication management        Relevant Orders    POCT waived urine drug screen manually resulted (Completed)      Screening for malignant neoplasm of prostate        Relevant Orders    Prostate Specific Antigen             OARRS:  Griffin Sharma MD on 6/25/2025  9:57 AM  I have personally reviewed the OARRS report for Terry Lee. I have considered the risks of abuse, dependence, addiction and diversion    Is the patient prescribed a combination of a benzodiazepine and opioid?  No    Last Urine Drug Screen / ordered today: Yes  Recent Results (from the past 8760 hours)   POCT " waived urine drug screen manually resulted    Collection Time: 06/25/25  9:25 AM   Result Value Ref Range    POC THC Negative Negative, Not applicable ng/mL    POC Cocaine Negative Negative, Not applicable ng/mL    POC Opiates Negative Negative, Not applicable ng/mL    POC Amphetamine Negative Negative, Not applicable ng/mL    POC Phencyclidine (PCP) Negative Negative, Not applicable ng/mL    POC Barbiturates Negative Negative, Not applicable ng/mL    POC Benzodiazepines Negative Negative, Not applicable ng/mL    POC Methamphetamine Negative Negative, Not applicable ng/mL    POC METHADONE MANUALLY ENTERED Negative Negative, Not applicable ng/mL    POC Ticyclic Antidepressants (TCA) Negative Negative, Not applicable ng/mL    POC Oxycodone Negative Negative, Not applicable ng/mL    POC MDMA URINE Negative Negative, Not applicable ng/mL    POC Morphine Urine Negative Negative, Not applicable ng/mL    POC Burprenorphine Urine Negative Negative, Not applicable ng/mL     Results are as expected.     Controlled Substance Agreement:  Date of the Last Agreement: 3/25/2025  Reviewed Controlled Substance Agreement including but not limited to the benefits, risks, and alternatives to treatment with a Controlled Substance medication(s).    Opioids:  What is the patient's goal of therapy? Pain control and able to walk and perform activities of daily living without pain.   Is this being achieved with current treatment? yes    I have calculated the patient's Morphine Dose Equivalent (MED):   I have considered referral to Pain Management and/or a specialist, and do not feel it is necessary at this time.    I feel that it is clinically indicated to continue this current medication regimen after consideration of alternative therapies, and other non-opioid treatment.    Opioid Risk Screening:  Family History of Substance Abuse  Alcohol: 0 (6/25/2025  9:00 AM)  Illegal Drugs: 0 (6/25/2025  9:00 AM)  Prescription Drugs: 0 (6/25/2025   9:00 AM)    Personal History of Substance Abuse  Alcohol: 0 (6/25/2025  9:00 AM)  Illegal Drugs: 0 (6/25/2025  9:00 AM)  Prescription Drugs: 0 (6/25/2025  9:00 AM)    Patient Age (16-45)  Age (16-45): 0 (6/25/2025  9:00 AM)    History of Preadolescent Sexual Abuse  History of Preadolescent Sexual Abuse: 0 (6/25/2025  9:00 AM)    Psychological Disease  Attention Deficit Disorder, Obsessive Compulsive Disorder, Bipolar, Schizophrenia: 0 (6/25/2025  9:00 AM)  Depression: 0 (6/25/2025  9:00 AM)    Total Score  Total Score: 0 (6/25/2025  9:00 AM)    Total Score Risk Category  TOTAL SCORE CATEGORY: Low Risk (0-3) (6/25/2025  9:00 AM)        Pain Assessment:  Analgesia  What was your pain level on average during the past week?: 3  What was your pain level at its worst during the past week?: 6  What percentage of your pain has been relieved during the past week?: 70 %  Is the amount of pain relief you are now obtaining from your current pain relievers enough to make a real difference in your life?: N  Query to Clinician: Is the patient's pain relief clinically significant?: Unsure    Activities of Daily Living  Physical Functioning: Same  Family Relationships: Same  Social Relationships: Same  Mood: Same  Sleep Patterns: Better  Overall Functioning: Better    Adverse Events  Is patient experiencing any side effects from current pain relievers?: N  Patient's Overall Severity of Side Effects: None      Assessment  Is your overall impression that this patient is benefiting from opioid therapy?: Yes  Specific Analgesic Plan: Continue present regimen      Scribe Attestation  By signing my name below, I, Crystal Pack, Scribe   attest that this documentation has been prepared under the direction and in the presence of Griffin Sharma MD .

## 2025-06-25 NOTE — ASSESSMENT & PLAN NOTE
Orders have been placed for fasting Lipid to be drawn in three months as ordered for further evaluation.   The nature of cardiac risk has been fully discussed with this patient. Discussed cardiovascular risk analysis and appropriate diet with the need for lifelong measures to reduce the risk. A regular exercise program is recommended to help achieve and maintain normal body weight, fitness and improve lipid balance. Patient education provided. They understand and agree with this course of treatment. They will return with new or worsening symptoms. Patient instructed to remain current with appropriate annual health maintenance.

## 2025-06-25 NOTE — ASSESSMENT & PLAN NOTE
Stable based on symptoms. Continue established treatment plan.  Natural history and expected course discussed. Questions answered.  Educational material distributed.  Proper lifting, bending technique discussed.  Stretching exercises discussed.  Regular aerobic and trunk strengthening exercises discussed.  Ice to affected area as needed for local pain relief.  Heat to affected area as needed for local pain relief.

## 2025-06-25 NOTE — ASSESSMENT & PLAN NOTE
"Worsening based on symptoms and exam. Treatment plan established. Disease monitoring and precautions, any treatment changes, patient instructions and follow-up are documented in encounter note.   Dietary sodium restriction.  Regular aerobic exercise program is recommended to help achieve and maintain normal body weight, fitness and improve lipid balance. .  Dietary changes: Increase soluble fiber  Plant sterols 2grams per day (e.g. Benecol)  Reduce saturated fat, \"trans\" monounsaturated fatty acids, and cholesterol  Orders have been placed for fasting CBC with diff and Comprehensive metabolic panel to be drawn in three months as ordered for further evaluation.     "

## 2025-09-25 ENCOUNTER — APPOINTMENT (OUTPATIENT)
Dept: PRIMARY CARE | Facility: CLINIC | Age: 65
End: 2025-09-25
Payer: MEDICARE

## (undated) DEVICE — Device

## (undated) DEVICE — NEEDLE, SAFETY, 25 GA X 1.5 IN

## (undated) DEVICE — SUTURE, MONOCRYL, 4-0, 27 IN, PS-2, UNDYED

## (undated) DEVICE — SYRINGE, 60 CC, IRRIGATION, BULB, CONTRO-BULB, PAPER POUCH

## (undated) DEVICE — TOWEL PACK 10-PK

## (undated) DEVICE — DRAPE, SHEET, 17 X 23 IN

## (undated) DEVICE — SOLUTION, INJECTION, USP, SODIUM CHLORIDE 0.9%, .9, NACL, 1000 ML, BAG

## (undated) DEVICE — BOWL, BASIN, 32 OZ, STERILE

## (undated) DEVICE — BANDAGE, COHESIVE, HAND TEAR, COFLEX, 2 X 5 YDS, LF

## (undated) DEVICE — GOWN, SURGICAL, ROYAL SILK, XL, STERILE

## (undated) DEVICE — APPLICATOR, CHLORAPREP, W/ORANGE TINT, 26ML

## (undated) DEVICE — STRAP, ARM BOARD, 32 X 1.5

## (undated) DEVICE — STRAP, VELCRO, BODY, 4 X 60IN, NS

## (undated) DEVICE — GLOVE, SURGICAL, PROTEXIS PI , 7.0, PF, LF

## (undated) DEVICE — SYRINGE, 10 CC, LUER LOCK

## (undated) DEVICE — GLOVE, SURGICAL, PROTEXIS PI , 7.5, PF, LF